# Patient Record
Sex: FEMALE | Race: WHITE | NOT HISPANIC OR LATINO | Employment: FULL TIME | URBAN - NONMETROPOLITAN AREA
[De-identification: names, ages, dates, MRNs, and addresses within clinical notes are randomized per-mention and may not be internally consistent; named-entity substitution may affect disease eponyms.]

---

## 2019-07-16 ENCOUNTER — OFFICE VISIT (OUTPATIENT)
Dept: URGENT CARE | Facility: PHYSICIAN GROUP | Age: 64
End: 2019-07-16
Payer: COMMERCIAL

## 2019-07-16 VITALS
RESPIRATION RATE: 14 BRPM | HEART RATE: 98 BPM | WEIGHT: 239 LBS | BODY MASS INDEX: 39.77 KG/M2 | SYSTOLIC BLOOD PRESSURE: 150 MMHG | DIASTOLIC BLOOD PRESSURE: 84 MMHG | OXYGEN SATURATION: 95 % | TEMPERATURE: 98 F

## 2019-07-16 DIAGNOSIS — F41.9 ANXIETY: ICD-10-CM

## 2019-07-16 DIAGNOSIS — I49.1 ATRIAL PREMATURE COMPLEXES: ICD-10-CM

## 2019-07-16 DIAGNOSIS — R06.02 SHORTNESS OF BREATH: ICD-10-CM

## 2019-07-16 PROCEDURE — 99204 OFFICE O/P NEW MOD 45 MIN: CPT | Performed by: FAMILY MEDICINE

## 2019-07-16 RX ORDER — ALPRAZOLAM 0.25 MG/1
TABLET ORAL
Qty: 30 TAB | Refills: 0 | Status: SHIPPED | OUTPATIENT
Start: 2019-07-16 | End: 2020-08-27 | Stop reason: SDUPTHER

## 2019-07-16 NOTE — PROGRESS NOTES
Chief Complaint:    Chief Complaint   Patient presents with   • Arm Pain     L arm    • Shaking     hard to breath yesterday   • Anxiety     happened yesterday        History of Present Illness:    Family present. She lives with them. This is a new problem. For 1 week, she has been having increasing emotional stress. A lot has been going on in the family. She has been feeling like she wants to hurt others. Yesterday, the stress built up to the point she was having anxiety attacks. She had shaking and shortness of breath. She has felt pain in left arm. She has had headache. She reports having similar type of symptoms in the distant past, possibly 30 years ago and was rx'd a medication to use prn. She does not have current chest pain. She and family deny that she would actually hurt herself or other people. She has never had any heart problems.      Review of Systems:    Constitutional: Negative for fever, chills, and diaphoresis.   Eyes: Negative for change in vision, photophobia, pain, redness, and discharge.  ENT: Negative for ear pain, ear discharge, hearing loss, tinnitus, nasal congestion, nosebleeds, and sore throat.    Respiratory: See HPI.   Cardiovascular: See HPI.    Gastrointestinal: Negative for abdominal pain, nausea, vomiting, diarrhea, constipation, blood in stool, and melena.   Genitourinary: Negative for dysuria, urinary urgency, urinary frequency, hematuria, and flank pain.   Musculoskeletal: See HPI.    Skin: Negative for rash and itching.   Neurological: See HPI.   Endo: Negative for polydipsia.   Heme: Does not bruise/bleed easily.   Psychiatric/Behavioral: See HPI.       Past Medical History:    Past Medical History:   Diagnosis Date   • Allergy    • Headache(784.0)    • Hypertension    • Migraine      Past Surgical History:    Past Surgical History:   Procedure Laterality Date   • ABDOMINAL HYSTERECTOMY TOTAL     • LIPOMA EXCISION     • OPEN REDUCTION      wrist   • TONSILLECTOMY     • WRIST  FUSION      right     Social History:    Social History     Social History   • Marital status: Single     Spouse name: N/A   • Number of children: N/A   • Years of education: N/A     Occupational History   • Not on file.     Social History Main Topics   • Smoking status: Never Smoker   • Smokeless tobacco: Never Used   • Alcohol use No   • Drug use: No   • Sexual activity: Not Currently     Other Topics Concern   • Not on file     Social History Narrative   • No narrative on file     Family History:    Family History   Problem Relation Age of Onset   • Diabetes Mother    • Hypertension Mother    • Stroke Mother    • Diabetes Sister    • Hypertension Sister    • Diabetes Brother    • Hypertension Brother    • Cancer Brother         lung, testicular   • Dementia Father 70        Alzheimer's   • Lung Disease Paternal Uncle         emphysema     Medications:    No current outpatient prescriptions on file prior to visit.     No current facility-administered medications on file prior to visit.      Allergies:    Allergies   Allergen Reactions   • Iodine    • Pcn [Penicillins]        Vitals:    Vitals:    07/16/19 0946   BP: 150/84   Pulse: 98   Resp: 14   Temp: 36.7 °C (98 °F)   TempSrc: Temporal   SpO2: 95%   Weight: 108.4 kg (239 lb)       Physical Exam:    Constitutional: Vital signs reviewed. Appears well-developed and well-nourished. At times on verge of tears.   Eyes: Sclera white, conjunctivae clear. PERRLA.  ENT: External ears normal. Hearing normal. Nasal mucosa pink. Lips/teeth are normal. Oral mucosa pink and moist. Posterior pharynx: WNL.  Neck: Neck supple.   Cardiovascular: Regular rate and rhythm. No murmur.  Pulmonary/Chest: Respirations non-labored. Clear to auscultation bilaterally.  Musculoskeletal: Normal gait. Normal range of motion. No tenderness to palpation. No muscular atrophy or weakness.  Neurological: Alert and oriented to person, place, and time. CN 2-12 intact. Muscle tone normal.  Coordination normal.   Skin: No rashes or lesions. Warm, dry, normal turgor.  Psychiatric: Normal mood and affect. Behavior is normal. Judgment and thought content normal.       Diagnostics:    EKG: Sinus rhythm 74. Borderline left axis deviation. Atrial premature complexes.    EKG: Sinus rhythm 76. Borderline left axis deviation.    (Machine reads)    Looking at EKGs, I do not see any signs of ischemia.    EKGs reviewed with them and copies to them.      Assessment / Plan:    1. Shortness of breath  - EKG    2. Anxiety  - ALPRAZolam (XANAX) 0.25 MG Tab; 1 OR 2 TABS UP TO 3 TIMES A DAY ONLY IF NEEDED FOR STRESS AND ANXIETY FOR UP TO 10 DAYS. MAY CAUSE DROWSINESS.  Dispense: 30 Tab; Refill: 0    3. Atrial premature complexes      Discussed with them DDX, management options, and risks, benefits, and alternatives to treatment plan agreed upon.    Premature Beats info given and discussed.    Family seems concerned about possible MI and I advised if this is their concern, patient should be seen in Emergency Room, not here as we do not have STAT labs and no imaging here.    I advised them if patient may hurt herself or others, she should go to [facility] in [place].    I advised I can Rx short-term med for anxiety. They would like.    Agreeable to medication prescribed.  report checked - no Rx x 3 years.    I advised her to follow-up with a PCP for further management of anxiety and mildly elevated blood pressure today.    I advised that patient should be seen in Emergency Room if getting worse or not improved with medication.

## 2020-01-10 ENCOUNTER — OFFICE VISIT (OUTPATIENT)
Dept: URGENT CARE | Facility: PHYSICIAN GROUP | Age: 65
End: 2020-01-10
Payer: COMMERCIAL

## 2020-01-10 ENCOUNTER — OCCUPATIONAL MEDICINE (OUTPATIENT)
Dept: URGENT CARE | Facility: PHYSICIAN GROUP | Age: 65
End: 2020-01-10
Payer: COMMERCIAL

## 2020-01-10 ENCOUNTER — APPOINTMENT (OUTPATIENT)
Dept: RADIOLOGY | Facility: IMAGING CENTER | Age: 65
End: 2020-01-10
Attending: NURSE PRACTITIONER
Payer: COMMERCIAL

## 2020-01-10 VITALS
WEIGHT: 275 LBS | DIASTOLIC BLOOD PRESSURE: 90 MMHG | BODY MASS INDEX: 45.76 KG/M2 | TEMPERATURE: 98.9 F | RESPIRATION RATE: 18 BRPM | HEART RATE: 73 BPM | SYSTOLIC BLOOD PRESSURE: 140 MMHG | OXYGEN SATURATION: 96 %

## 2020-01-10 VITALS — WEIGHT: 256 LBS | BODY MASS INDEX: 42.6 KG/M2

## 2020-01-10 DIAGNOSIS — S92.412A CLOSED DISPLACED FRACTURE OF PROXIMAL PHALANX OF LEFT GREAT TOE, INITIAL ENCOUNTER: ICD-10-CM

## 2020-01-10 DIAGNOSIS — S99.922A INJURY OF TOE ON LEFT FOOT, INITIAL ENCOUNTER: ICD-10-CM

## 2020-01-10 PROCEDURE — 99214 OFFICE O/P EST MOD 30 MIN: CPT | Mod: 29 | Performed by: NURSE PRACTITIONER

## 2020-01-10 PROCEDURE — 73660 X-RAY EXAM OF TOE(S): CPT | Mod: TC,FY,LT,29 | Performed by: NURSE PRACTITIONER

## 2020-01-10 ASSESSMENT — ENCOUNTER SYMPTOMS
CHILLS: 0
SHORTNESS OF BREATH: 0
FEVER: 0

## 2020-01-10 NOTE — PROGRESS NOTES
"Subjective:   Haylee Jean-Baptiste is a 64 y.o. female who presents for Toe Injury    DOI: 12/5/2019 patient states was unloading cans inside plastic wrap from a shelf when the plastic wrap came loose and the cans dropped on her left great toe. States since injury with bruising and pain. Has been taking Ibuprofen, icing, and cheikh taping the toe with mild improvement. States with intermittent numbness to the area and with swelling, where she \"feels a lump.\" Denies prior injury to the area     HPI     Review of Systems   Constitutional: Negative for chills and fever.   Respiratory: Negative for shortness of breath.    Cardiovascular: Negative for chest pain.   Musculoskeletal: Positive for joint pain.     Patient's PMH, SocHx, SurgHx, FamHx, Drug allergies and medications reviewed.     Objective:   /90   Pulse 73   Temp 37.2 °C (98.9 °F)   Resp 18   Wt 124.7 kg (275 lb)   SpO2 96%   BMI 45.76 kg/m²   Physical Exam  Vitals signs reviewed.   Constitutional:       General: She is not in acute distress.     Appearance: She is well-developed. She is not diaphoretic.   HENT:      Head: Normocephalic.      Right Ear: Hearing normal.      Left Ear: Hearing normal.      Nose: Nose normal.   Eyes:      General: Lids are normal.      Conjunctiva/sclera: Conjunctivae normal.      Pupils: Pupils are equal, round, and reactive to light.   Neck:      Musculoskeletal: Normal range of motion.   Cardiovascular:      Rate and Rhythm: Normal rate and regular rhythm.      Heart sounds: Normal heart sounds.   Pulmonary:      Effort: Pulmonary effort is normal. No respiratory distress.      Breath sounds: Normal breath sounds. No decreased breath sounds or wheezing.   Musculoskeletal:      Left foot: Decreased range of motion. Tenderness and swelling present.        Feet:    Skin:     General: Skin is warm.      Findings: No rash.   Neurological:      Mental Status: She is alert.   Psychiatric:         Speech: Speech normal.      " Behavior: Behavior normal.         Thought Content: Thought content normal.         Judgment: Judgment normal.       Left foot: Great toe with TTP to the DIP with swelling. No bruising present. Mild erythema. Neurovascular and sensation intact. Mild decreased ROM with flexion of the toe.     Assessment/Plan:   Assessment    1. Injury of toe on left foot, initial encounter  - DX-TOE(S) 2+ LEFT; Future  - REFERRAL TO ORTHOPEDICS    2. Closed displaced fracture of proximal phalanx of left great toe, initial encounter  - DX-TOE(S) 2+ LEFT; Future  - REFERRAL TO ORTHOPEDICS    Restrictions per D39    Differential diagnosis, natural history, supportive care, and indications for immediate follow-up discussed.     **Please note that all invasive procedures during this visit were performed by myself and/or the Medical Assistant under the supervision of the PA or MD in office**

## 2020-01-10 NOTE — LETTER
EMPLOYEE’S CLAIM FOR COMPENSATION/ REPORT OF INITIAL TREATMENT  FORM C-4    EMPLOYEE’S CLAIM - PROVIDE ALL INFORMATION REQUESTED   First Name  Haylee Last Name   Birthdate                    1955                Sex  female Claim Number   Home Address  [address] Age  64 y.o. Height   Weight  124.7 kg (275 lb) [facility]     [place] Zip  [address] Telephone  [phone] (home)    Mailing Address  [address] [place] Zip  [address] Primary Language Spoken  English    Insurer   Third Party   [employer]   Employee's Occupation (Job Title) When Injury or Occupational Disease Occurred      Employer's Name  [employer]  Telephone  [phone]    Employer Address  [address]  [address]    Date of Injury  12/5/2019               Hour of Injury  6:05 AM Date Employer Notified  12/5/2019 Last Day of Work after Injury or Occupational Disease  1/10/2020 Supervisor to Whom Injury Reported  Socorro Noel    Address or Location of Accident (if applicable)  [[address] [place] [address]]   What were you doing at the time of accident? (if applicable)  Putting Supplies on Shelf    How did this injury or occupational disease occur? (Be specific an answer in detail. Use additional sheet if necessary)  Dropped Case of Sauce on toes    If you believe that you have an occupational disease, when did you first have knowledge of the disability and it relationship to your employment?  n/a Witnesses to the Accident  n/a      Nature of Injury or Occupational Disease  Crushing  Part(s) of Body Injured or Affected  Foot (L), N/A, N/A    I certify that the above is true and correct to the best of my knowledge and that I have provided this information in order to obtain the benefits of [place]’s [place]s [place]).  I hereby authorize any physician, chiropractor, surgeon, practitioner, or other person, any hospital, including [facility] or [facility], any medical service organization, any insurance company, or other institution or organization to release to each other, any medical or other information, including benefits paid or payable, pertinent to this injury or disease, except information relative to diagnosis, treatment and/or counseling for AIDS, psychological conditions, alcohol or controlled substances, for which I must give specific authorization.  A Photostat of this authorization shall be as valid as the original.     Date   Place   Employee’s Signature   THIS REPORT MUST BE COMPLETED AND MAILED WITHIN 3 WORKING DAYS OF TREATMENT   Place  [facility]  Name of Facility  [facility]   Date  1/10/2020 Diagnosis  (S99.922A) Injury of toe on left foot, initial encounter  (S92.412A) Closed displaced fracture of proximal phalanx of left great toe, initial encounter Is there evidence the injured employee was under the influence of alcohol and/or another controlled substance at the time of accident?   Hour  1:15 PM Description of Injury or Disease  Diagnoses of Injury of toe on left foot, initial encounter and Closed displaced fracture of proximal phalanx of left great toe, initial encounter were pertinent to this visit.     Treatment  Must wear post op shoe. Rest and elevate the affected area with pillows.  You may apply ice packs to the affected area up to 4 times daily for 30 minutes at a time  OTC Ibuprofen.  Referral to Ortho  Have you advised the patient to remain off work five days or more? No   X-Ray Findings  Positive   If Yes   From Date  To Date      From information given by the employee, together with medical evidence, can you directly connect this injury or occupational disease as job incurred?  Yes If No Full Duty No  "Modified Duty  Yes   Is additional medical care by a physician indicated?  Yes If Modified Duty, Specify any Limitations / Restrictions      Do you know of any previous injury or disease contributing to this condition or occupational disease?                            No   Date  1/10/2020 Print Doctor’s Name NICO Barclay I certify the employer’s copy of  this form was mailed on:   Address  [address] Insurer’s Use Only     [place] [facility]  [address]    Provider’s Tax ID Number  [id number] Telephone  Dept: [phone]        armando-MARTHA Wild   e-Signature: Dr. Arben Donnelly,   Medical Director Degree  MD        ORIGINAL-TREATING PHYSICIAN OR CHIROPRACTOR    PAGE 2-INSURER/TPA    PAGE 3-EMPLOYER    PAGE 4-EMPLOYEE             Form C-4 (rev.10/07)              BRIEF DESCRIPTION OF RIGHTS AND BENEFITS  (Pursuant to [place]RS 616C.050)    Notice of Injury or Occupational Disease (Incident Report Form C-1): If an injury or occupational disease (OD) arises out of and in the course of employment, you must provide written notice to your employer as soon as practicable, but no later than 7 days after the accident or OD. Your employer shall maintain a sufficient supply of the required forms.    Claim for Compensation (Form C-4): If medical treatment is sought, the form C-4 is available at the place of initial treatment. A completed \"Claim for Compensation\" (Form C-4) must be filed within 90 days after an accident or OD. The treating physician or chiropractor must, within 3 working days after treatment, complete and mail to the employer, the employer's insurer and third-party , the Claim for Compensation.    Medical Treatment: If you require medical treatment for your on-the-job injury or OD, you may be required to select a physician or chiropractor from a list provided by your workers’ compensation insurer, if it has contracted with an Organization for Managed Care " (MCO) or Preferred Provider Organization (PPO) or providers of health care. If your employer has not entered into a contract with an MCO or PPO, you may select a physician or chiropractor from the Panel of Physicians and Chiropractors. Any medical costs related to your industrial injury or OD will be paid by your insurer.    Temporary Total Disability (TTD): If your doctor has certified that you are unable to work for a period of at least 5 consecutive days, or 5 cumulative days in a 20-day period, or places restrictions on you that your employer does not accommodate, you may be entitled to TTD compensation.    Temporary Partial Disability (TPD): If the wage you receive upon reemployment is less than the compensation for TTD to which you are entitled, the insurer may be required to pay you TPD compensation to make up the difference. TPD can only be paid for a maximum of 24 months.    Permanent Partial Disability (PPD): When your medical condition is stable and there is an indication of a PPD as a result of your injury or OD, within 30 days, your insurer must arrange for an evaluation by a rating physician or chiropractor to determine the degree of your PPD. The amount of your PPD award depends on the date of injury, the results of the PPD evaluation and your age and wage.    Permanent Total Disability (PTD): If you are medically certified by a treating physician or chiropractor as permanently and totally disabled and have been granted a PTD status by your insurer, you are entitled to receive monthly benefits not to exceed 66 2/3% of your average monthly wage. The amount of your PTD payments is subject to reduction if you previously received a PPD award.    Vocational Rehabilitation Services: You may be eligible for vocational rehabilitation services if you are unable to return to the job due to a permanent physical impairment or permanent restrictions as a result of your injury or occupational  disease.    Transportation and Per Mario Reimbursement: You may be eligible for travel expenses and per mario associated with medical treatment.    Reopening: You may be able to reopen your claim if your condition worsens after claim closure.    Appeal Process: If you disagree with a written determination issued by the insurer or the insurer does not respond to your request, you may appeal to the Department of Administration, , by following the instructions contained in your determination letter. You must appeal the determination within 70 days from the date of the determination letter at [address], [address], [place], [place] [address], or [address], [address], [place], [place] [address]. If you disagree with the  decision, you may appeal to the Department of Administration, . You must file your appeal within 30 days from the date of the  decision letter at [address], [address], [place], [place] [address], or [address], [place], [place] [address]. If you disagree with a decision of an , you may file a petition for judicial review with the District Court. You must do so within 30 days of the Appeal Officer’s decision. You may be represented by an  at your own expense or you may contact the [facility] for possible representation.    [place]  for Injured Workers (NAIW): If you disagree with a  decision, you may request that NAIW represent you without charge at an  Hearing. For information regarding denial of benefits, you may contact the [facility] at: [address], [address], [place] [address], [phone], or [address], [place] [address], [phone]    To File a Complaint with the Division: If you wish to file a complaint with the  of the Division of Industrial Relations (DIR),  please contact the  Workers’ Compensation Section, [address], [address], [place], [place] [address], telephone ([phone]) [phone], or [address], [address], [place], [place] [address], telephone [phone].    For assistance with Workers’ Compensation Issues: You may contact the Office of the Governor Consumer Health Assistance, [address] [place] [address], [address], [place], [place] [address], Toll Free 1-428.984.8344, Web site: http://[email or web address].[facility].[email or web address]., E-mail [email or web address].[email or web address].                   __________________________________________________________________                                                     _________        Employee Name / Signature                                                                                                                                              Date                                                                                                                                                                                                     D-2 (rev. 06/18)

## 2020-01-10 NOTE — LETTER
"   [facility] [place] [address]  Phone:  [phone] - Fax:  [phone]   [facility] Progress Report and Disability Certification  Date of Service: 1/10/2020   No Show:  No  Date / Time of Next Visit: 1/24/2020   Claim Information   Patient Name: Haylee Jean-Baptiste  Claim Number:     Employer: [employer]  Date of Injury: 12/5/2019     Insurer / TPA: [employer]  ID / SSN:     Occupation:   Diagnosis: Diagnoses of Injury of toe on left foot, initial encounter and Closed displaced fracture of proximal phalanx of left great toe, initial encounter were pertinent to this visit.    Medical Information   Related to Industrial Injury? Yes    Subjective Complaints:  DOI: 12/5/2019 patient states was unloading cans inside plastic wrap from a shelf when the plastic wrap came loose and the cans dropped on her left great toe. States since injury with bruising and pain. Has been taking Ibuprofen, icing, and cheikh taping the toe with mild improvement. States with intermittent numbness to the area and with swelling, where she \"feels a lump.\" Denies prior injury to the area     Objective Findings: Left foot: Great toe with TTP to the DIP with swelling. No bruising present. Mild erythema. Neurovascular and sensation intact. Mild decreased ROM with flexion of the toe.     Pre-Existing Condition(s): None   Assessment:   Initial Visit    Status: Additional Care Required  Permanent Disability:No    Plan: Diagnostics    Diagnostics: X-ray    Comments:  Xray:\"Minimally displaced, possibly subacute fracture through the midshaft of the first proximal phalanx.\"    Disability Information   Status: Released to Restricted Duty    From:  1/10/2020  Through: 1/24/2020 Restrictions are: Temporary   Physical Restrictions   Sitting:    Standing:  < or = to 4 hrs/day Stooping:    Bending:      Squatting:    Walking:  < or = to 4 hrs/day " Climbing:    Pushing:      Pulling:    Other:    Reaching Above Shoulder (L):   Reaching Above Shoulder (R):       Reaching Below Shoulder (L):    Reaching Below Shoulder (R):      Not to exceed Weight Limits   Carrying(hrs):   Weight Limit(lb):   Lifting(hrs):   Weight  Limit(lb):     Comments: Must wear post op shoe. Rest and elevate the affected area with pillows.  You may apply ice packs to the affected area up to 4 times daily for 30 minutes at a time  OTC Ibuprofen.  Referral to Ortho        Repetitive Actions   Hands: i.e. Fine Manipulations from Grasping:     Feet: i.e. Operating Foot Controls:     Driving / Operate Machinery:     Physician Name: NICO Barclay Physician Signature: MARTHA Salamanca e-Signature: Dr. Arben Donnelly, Medical Director   Clinic Name / Location: [address] [address] Clinic Phone Number: Dept: [phone]   Appointment Time: 1:05 Pm Visit Start Time: 1:15 PM   Check-In Time:  1:14 Pm Visit Discharge Time:  1:52 PM    Original-Treating Physician or Chiropractor    Page 2-Insurer/TPA    Page 3-Employer    Page 4-Employee

## 2020-08-27 ENCOUNTER — OFFICE VISIT (OUTPATIENT)
Dept: MEDICAL GROUP | Facility: PHYSICIAN GROUP | Age: 65
End: 2020-08-27
Payer: COMMERCIAL

## 2020-08-27 VITALS
OXYGEN SATURATION: 97 % | SYSTOLIC BLOOD PRESSURE: 122 MMHG | RESPIRATION RATE: 14 BRPM | TEMPERATURE: 98.1 F | WEIGHT: 273 LBS | BODY MASS INDEX: 45.48 KG/M2 | HEART RATE: 71 BPM | HEIGHT: 65 IN | DIASTOLIC BLOOD PRESSURE: 76 MMHG

## 2020-08-27 DIAGNOSIS — Z13.21 ENCOUNTER FOR VITAMIN DEFICIENCY SCREENING: ICD-10-CM

## 2020-08-27 DIAGNOSIS — Z13.29 SCREENING FOR THYROID DISORDER: ICD-10-CM

## 2020-08-27 DIAGNOSIS — Z12.39 SCREENING FOR BREAST CANCER: ICD-10-CM

## 2020-08-27 DIAGNOSIS — F41.9 ANXIETY: ICD-10-CM

## 2020-08-27 DIAGNOSIS — Z13.1 SCREENING FOR DIABETES MELLITUS: ICD-10-CM

## 2020-08-27 DIAGNOSIS — I10 ESSENTIAL HYPERTENSION: ICD-10-CM

## 2020-08-27 DIAGNOSIS — R41.3 MEMORY LOSS: ICD-10-CM

## 2020-08-27 DIAGNOSIS — F51.01 PRIMARY INSOMNIA: ICD-10-CM

## 2020-08-27 PROCEDURE — 99214 OFFICE O/P EST MOD 30 MIN: CPT | Performed by: NURSE PRACTITIONER

## 2020-08-27 RX ORDER — ALPRAZOLAM 0.25 MG/1
0.25 TABLET ORAL 3 TIMES DAILY PRN
COMMUNITY
End: 2020-08-27

## 2020-08-27 RX ORDER — CHOLECALCIFEROL (VITAMIN D3) 25 MCG
TABLET,CHEWABLE ORAL
COMMUNITY
End: 2021-04-23

## 2020-08-27 RX ORDER — ALPRAZOLAM 0.25 MG/1
TABLET ORAL
Qty: 30 TAB | Refills: 0 | Status: SHIPPED | OUTPATIENT
Start: 2020-08-27 | End: 2020-09-06

## 2020-08-27 ASSESSMENT — PATIENT HEALTH QUESTIONNAIRE - PHQ9
5. POOR APPETITE OR OVEREATING: 1 - SEVERAL DAYS
SUM OF ALL RESPONSES TO PHQ QUESTIONS 1-9: 9
CLINICAL INTERPRETATION OF PHQ2 SCORE: 4

## 2020-08-27 NOTE — PROGRESS NOTES
Chief Complaint   Patient presents with   • Establish Care   • Memory Loss     Per patient          This is a 65 y.o.female patient that presents today with the following: Establish care with new PCP, medication refills    Hypertension  Chronic and stable  Well-controlled on lifestyle modifications  Blood pressure today well within normal limits and denies symptoms of hypertension  Due for labs    Memory loss  Patient reports to me that her daughter feels that she has had memory changes  Patient does not agree with this at all, however she does have a family history of Alzheimer's disease especially maternal aunt  Patient is very well oriented x4, does not report any confusion or any other neurological symptoms  Nobody else especially her coworkers mention any concerns for memory changes, patient reports to me that she feels her daughter she is more worried because of a family history    Primary insomnia  This is chronic and stable  Well-controlled with very sporadic use of alprazolam, this was last refilled over a year ago  Yamilet LENNON reviewed, taking medications appropriately with no concerning behavior  Patient understands the risks associated with use of benzodiazepines  Discussed the importance of sleep hygiene    Anxiety  See additional notes, takes alprazolam very sporadically not only for insomnia but for very sporadic episodes of panic attack      No visits with results within 1 Month(s) from this visit.   Latest known visit with results is:   Hospital Outpatient Visit on 04/19/2016   Component Date Value   • Occult Blood, IA 04/19/2016 Negative          clinical course has been stable    Past Medical History:   Diagnosis Date   • Allergy    • Anxiety 8/28/2020   • Headache(784.0)    • Hypertension    • Migraine        Past Surgical History:   Procedure Laterality Date   • ABDOMINAL HYSTERECTOMY TOTAL     • LIPOMA EXCISION     • OPEN REDUCTION      wrist   • TONSILLECTOMY     • WRIST FUSION      right  "      Family History   Problem Relation Age of Onset   • Diabetes Mother    • Hypertension Mother    • Stroke Mother    • Diabetes Sister    • Hypertension Sister    • Diabetes Brother    • Hypertension Brother    • Cancer Brother         lung, testicular   • Dementia Father 70        Alzheimer's   • Lung Disease Paternal Uncle         emphysema       Iodine and Pcn [penicillins]    Current Outpatient Medications Ordered in Epic   Medication Sig Dispense Refill   • ALPRAZolam (XANAX) 0.25 MG Tab 1 OR 2 TABS UP TO 3 TIMES A DAY ONLY IF NEEDED FOR STRESS AND ANXIETY FOR UP TO 10 DAYS. MAY CAUSE DROWSINESS. 30 Tab 0   • TURMERIC PO Take  by mouth.     • VITAMIN D, ERGOCALCIFEROL, PO Take  by mouth.     • Cyanocobalamin (B-12) 1000 MCG Cap Take  by mouth.     • APPLE CIDER VINEGAR PO Take  by mouth.       No current Epic-ordered facility-administered medications on file.        Constitutional ROS: No unexpected change in weight, No weakness, No unexplained fevers, sweats, or chills, Positive for insomnia   Pulmonary ROS: No chronic cough, sputum, or hemoptysis, No shortness of breath, No recent change in breathing  Cardiovascular ROS: No chest pain, No edema, No palpitations  Gastrointestinal ROS: No abdominal pain, No nausea, vomiting, diarrhea, or constipation  Musculoskeletal/Extremities ROS: No clubbing, No peripheral edema, No pain, redness or swelling on the joints  Neurologic ROS: Normal development, No seizures, No weakness  Psych ROS: Positive per HPI    Physical exam:  /76   Pulse 71   Temp 36.7 °C (98.1 °F) (Temporal)   Resp 14   Ht 1.651 m (5' 5\")   Wt 123.8 kg (273 lb)   SpO2 97%   BMI 45.43 kg/m²   General Appearance: Very pleasant middle-aged older female, alert, no distress, obese, well-groomed  Skin: Skin color, texture, turgor normal. No rashes or lesions.  Neck: negative findings: no asymmetry, masses, or scars, no adenopathy, trachea midline and normal to palpitation  Lungs: negative " findings: normal respiratory rate and rhythm, lungs clear to auscultation  Heart: negative. RRR without murmur, gallop, or rubs.  No ectopy.  Abdomen: Abdomen soft, non-tender. BS normal. No masses,  No organomegaly  Musculoskeletal: negative findings: no evidence of joint instability, no evidence of muscle atrophy, no deformities present  Peripheral Pulses: Normal  Neurologic: intact, CN II through XII grossly intact    Medical decision making/discussion:     Labs    Mammogram    Refilled Alprazolam, take only as needed    Haylee was seen today for establish care and memory loss.    Diagnoses and all orders for this visit:    Memory loss    Essential hypertension  -     CBC WITH DIFFERENTIAL; Future  -     Comp Metabolic Panel; Future  -     Lipid Profile; Future    Primary insomnia    Anxiety  -     ALPRAZolam (XANAX) 0.25 MG Tab; 1 OR 2 TABS UP TO 3 TIMES A DAY ONLY IF NEEDED FOR STRESS AND ANXIETY FOR UP TO 10 DAYS. MAY CAUSE DROWSINESS.    Screening for diabetes mellitus  -     HEMOGLOBIN A1C; Future    Screening for thyroid disorder  -     TSH WITH REFLEX TO FT4; Future    Encounter for vitamin deficiency screening  -     VITAMIN D,25 HYDROXY; Future  -     VITAMIN B12; Future    Screening for breast cancer  -     MA-SCREENING MAMMO BILAT W/CAD; Future        Return in about 1 year (around 8/27/2021).        Please note that this dictation was created using voice recognition software. I have made every reasonable attempt to correct obvious errors, but I expect that there are errors of grammar and possibly content that I did not discover before finalizing the note.

## 2020-08-28 PROBLEM — F41.9 ANXIETY: Status: ACTIVE | Noted: 2020-08-28

## 2020-08-28 NOTE — ASSESSMENT & PLAN NOTE
See additional notes, takes alprazolam very sporadically not only for insomnia but for very sporadic episodes of panic attack

## 2020-08-28 NOTE — ASSESSMENT & PLAN NOTE
Chronic and stable  Well-controlled on lifestyle modifications  Blood pressure today well within normal limits and denies symptoms of hypertension  Due for labs

## 2020-08-28 NOTE — ASSESSMENT & PLAN NOTE
This is chronic and stable  Well-controlled with very sporadic use of alprazolam, this was last refilled over a year ago  Yamilet LENNON reviewed, taking medications appropriately with no concerning behavior  Patient understands the risks associated with use of benzodiazepines  Discussed the importance of sleep hygiene

## 2020-08-28 NOTE — ASSESSMENT & PLAN NOTE
Patient reports to me that her daughter feels that she has had memory changes  Patient does not agree with this at all, however she does have a family history of Alzheimer's disease especially maternal aunt  Patient is very well oriented x4, does not report any confusion or any other neurological symptoms  Nobody else especially her coworkers mention any concerns for memory changes, patient reports to me that she feels her daughter she is more worried because of a family history

## 2020-09-04 ENCOUNTER — HOSPITAL ENCOUNTER (OUTPATIENT)
Dept: LAB | Facility: MEDICAL CENTER | Age: 65
End: 2020-09-04
Attending: NURSE PRACTITIONER
Payer: COMMERCIAL

## 2020-09-04 DIAGNOSIS — I10 ESSENTIAL HYPERTENSION: ICD-10-CM

## 2020-09-04 DIAGNOSIS — Z13.29 SCREENING FOR THYROID DISORDER: ICD-10-CM

## 2020-09-04 DIAGNOSIS — Z13.21 ENCOUNTER FOR VITAMIN DEFICIENCY SCREENING: ICD-10-CM

## 2020-09-04 DIAGNOSIS — Z13.1 SCREENING FOR DIABETES MELLITUS: ICD-10-CM

## 2020-09-04 LAB
25(OH)D3 SERPL-MCNC: 32 NG/ML (ref 30–100)
ALBUMIN SERPL BCP-MCNC: 4.1 G/DL (ref 3.2–4.9)
ALBUMIN/GLOB SERPL: 1.5 G/DL
ALP SERPL-CCNC: 81 U/L (ref 30–99)
ALT SERPL-CCNC: 12 U/L (ref 2–50)
ANION GAP SERPL CALC-SCNC: 12 MMOL/L (ref 7–16)
AST SERPL-CCNC: 16 U/L (ref 12–45)
BASOPHILS # BLD AUTO: 0.5 % (ref 0–1.8)
BASOPHILS # BLD: 0.03 K/UL (ref 0–0.12)
BILIRUB SERPL-MCNC: 0.5 MG/DL (ref 0.1–1.5)
BUN SERPL-MCNC: 13 MG/DL (ref 8–22)
CALCIUM SERPL-MCNC: 9.6 MG/DL (ref 8.5–10.5)
CHLORIDE SERPL-SCNC: 108 MMOL/L (ref 96–112)
CHOLEST SERPL-MCNC: 188 MG/DL (ref 100–199)
CO2 SERPL-SCNC: 23 MMOL/L (ref 20–33)
CREAT SERPL-MCNC: 0.59 MG/DL (ref 0.5–1.4)
EOSINOPHIL # BLD AUTO: 0.13 K/UL (ref 0–0.51)
EOSINOPHIL NFR BLD: 2.3 % (ref 0–6.9)
ERYTHROCYTE [DISTWIDTH] IN BLOOD BY AUTOMATED COUNT: 46.5 FL (ref 35.9–50)
EST. AVERAGE GLUCOSE BLD GHB EST-MCNC: 105 MG/DL
FASTING STATUS PATIENT QL REPORTED: NORMAL
GLOBULIN SER CALC-MCNC: 2.8 G/DL (ref 1.9–3.5)
GLUCOSE SERPL-MCNC: 90 MG/DL (ref 65–99)
HBA1C MFR BLD: 5.3 % (ref 0–5.6)
HCT VFR BLD AUTO: 41.7 % (ref 37–47)
HDLC SERPL-MCNC: 56 MG/DL
HGB BLD-MCNC: 13.7 G/DL (ref 12–16)
IMM GRANULOCYTES # BLD AUTO: 0.02 K/UL (ref 0–0.11)
IMM GRANULOCYTES NFR BLD AUTO: 0.4 % (ref 0–0.9)
LDLC SERPL CALC-MCNC: 115 MG/DL
LYMPHOCYTES # BLD AUTO: 1.84 K/UL (ref 1–4.8)
LYMPHOCYTES NFR BLD: 32.7 % (ref 22–41)
MCH RBC QN AUTO: 32 PG (ref 27–33)
MCHC RBC AUTO-ENTMCNC: 32.9 G/DL (ref 33.6–35)
MCV RBC AUTO: 97.4 FL (ref 81.4–97.8)
MONOCYTES # BLD AUTO: 0.42 K/UL (ref 0–0.85)
MONOCYTES NFR BLD AUTO: 7.5 % (ref 0–13.4)
NEUTROPHILS # BLD AUTO: 3.19 K/UL (ref 2–7.15)
NEUTROPHILS NFR BLD: 56.6 % (ref 44–72)
NRBC # BLD AUTO: 0 K/UL
NRBC BLD-RTO: 0 /100 WBC
PLATELET # BLD AUTO: 295 K/UL (ref 164–446)
PMV BLD AUTO: 10.1 FL (ref 9–12.9)
POTASSIUM SERPL-SCNC: 4 MMOL/L (ref 3.6–5.5)
PROT SERPL-MCNC: 6.9 G/DL (ref 6–8.2)
RBC # BLD AUTO: 4.28 M/UL (ref 4.2–5.4)
SODIUM SERPL-SCNC: 143 MMOL/L (ref 135–145)
TRIGL SERPL-MCNC: 85 MG/DL (ref 0–149)
TSH SERPL DL<=0.005 MIU/L-ACNC: 1.81 UIU/ML (ref 0.38–5.33)
VIT B12 SERPL-MCNC: 665 PG/ML (ref 211–911)
WBC # BLD AUTO: 5.6 K/UL (ref 4.8–10.8)

## 2020-09-04 PROCEDURE — 82607 VITAMIN B-12: CPT

## 2020-09-04 PROCEDURE — 80053 COMPREHEN METABOLIC PANEL: CPT

## 2020-09-04 PROCEDURE — 80061 LIPID PANEL: CPT

## 2020-09-04 PROCEDURE — 85025 COMPLETE CBC W/AUTO DIFF WBC: CPT

## 2020-09-04 PROCEDURE — 82306 VITAMIN D 25 HYDROXY: CPT

## 2020-09-04 PROCEDURE — 83036 HEMOGLOBIN GLYCOSYLATED A1C: CPT

## 2020-09-04 PROCEDURE — 36415 COLL VENOUS BLD VENIPUNCTURE: CPT

## 2020-09-04 PROCEDURE — 84443 ASSAY THYROID STIM HORMONE: CPT

## 2020-09-09 ENCOUNTER — TELEPHONE (OUTPATIENT)
Dept: MEDICAL GROUP | Facility: PHYSICIAN GROUP | Age: 65
End: 2020-09-09

## 2021-01-25 ENCOUNTER — APPOINTMENT (OUTPATIENT)
Dept: MEDICAL GROUP | Facility: PHYSICIAN GROUP | Age: 66
End: 2021-01-25
Payer: COMMERCIAL

## 2021-01-27 ENCOUNTER — TELEMEDICINE (OUTPATIENT)
Dept: MEDICAL GROUP | Facility: PHYSICIAN GROUP | Age: 66
End: 2021-01-27
Payer: COMMERCIAL

## 2021-01-27 VITALS — WEIGHT: 270 LBS | HEIGHT: 65 IN | BODY MASS INDEX: 44.98 KG/M2

## 2021-01-27 DIAGNOSIS — R21 RASH: ICD-10-CM

## 2021-01-27 PROCEDURE — 99213 OFFICE O/P EST LOW 20 MIN: CPT | Mod: 95,CR | Performed by: NURSE PRACTITIONER

## 2021-01-27 RX ORDER — TRIAMCINOLONE ACETONIDE 1 MG/G
CREAM TOPICAL
Qty: 45 G | Refills: 1 | Status: SHIPPED | OUTPATIENT
Start: 2021-01-27 | End: 2024-03-18

## 2021-01-27 ASSESSMENT — FIBROSIS 4 INDEX: FIB4 SCORE: 1.02

## 2021-01-27 ASSESSMENT — PATIENT HEALTH QUESTIONNAIRE - PHQ9
SUM OF ALL RESPONSES TO PHQ QUESTIONS 1-9: 6
5. POOR APPETITE OR OVEREATING: 1 - SEVERAL DAYS
CLINICAL INTERPRETATION OF PHQ2 SCORE: 3

## 2021-01-27 NOTE — PROGRESS NOTES
Virtual Visit: Established Patient   This visit was conducted via Zoom using secure and encrypted videoconferencing technology. The patient was in a private location in the state of [place].    The patient's identity was confirmed and verbal consent was obtained for this virtual visit.    Subjective:   CC:   Chief Complaint   Patient presents with   • Rash       Haylee Jean-Baptiste is a 65 y.o. female presenting for evaluation and management of:    Rash  Patient has developed what she describes as eczematous type rash on her right lower groin  Initially she thought this was shingles but states that she googled typical symptoms of shingles and her symptoms are not consistent  Does report rash has nearly resolved  She understands that it is difficult to adequately evaluated this on a VV, but does agree to empirically treat with steroid cream and will follow up in office if rash returns, worsens or if she develops other symptoms        ROS   Denies any recent fevers or chills. No nausea or vomiting. No chest pains or shortness of breath.   Skin ROS: positive per HPI    Allergies   Allergen Reactions   • Iodine    • Pcn [Penicillins]        Current medicines (including changes today)  Current Outpatient Medications   Medication Sig Dispense Refill   • triamcinolone acetonide (KENALOG) 0.1 % Cream Apply to affected area twice a day for no more than 2 weeks at a time 45 g 1   • TURMERIC PO Take  by mouth.     • VITAMIN D, ERGOCALCIFEROL, PO Take  by mouth.     • Cyanocobalamin (B-12) 1000 MCG Cap Take  by mouth.     • APPLE CIDER VINEGAR PO Take  by mouth.       No current facility-administered medications for this visit.        Patient Active Problem List    Diagnosis Date Noted   • Rash 01/27/2021   • Anxiety 08/28/2020   • Memory loss 08/27/2020   • Primary insomnia 08/27/2020   • Fatigue 09/24/2014   • Weight loss 09/24/2014   • Hypertension 09/16/2014       Family History   Problem Relation Age of Onset   • Diabetes Mother   "  • Hypertension Mother    • Stroke Mother    • Diabetes Sister    • Hypertension Sister    • Diabetes Brother    • Hypertension Brother    • Cancer Brother         lung, testicular   • Dementia Father 70        Alzheimer's   • Lung Disease Paternal Uncle         emphysema       She  has a past medical history of Allergy, Anxiety (8/28/2020), Headache(784.0), Hypertension, and Migraine. She also has no past medical history of Addisons disease (HCC), Adrenal disorder (HCC), Anemia, Arrhythmia, Arthritis, ASTHMA, Asthma, Blood transfusion, Blood transfusion, without reported diagnosis, Cancer (HCC), CATARACT, CHF (congestive heart failure) (HCC), Clotting disorder (HCC), COPD, Cushings syndrome (HCC), Depression, Diabetes, Diabetic neuropathy (HCC), EMPHYSEMA, GERD (gastroesophageal reflux disease), Glaucoma, Goiter, Heart attack (HCC), Heart murmur, HIV (human immunodeficiency virus infection), Hyperlipidemia, IBD (inflammatory bowel disease), Kidney disease, Meningitis, Muscle disorder, OSTEOPOROSIS, Parathyroid disorder (HCC), Pituitary disease (HCC), Seizure (HCC), Sickle cell disease (HCC), Stroke (HCC), Substance abuse (HCC), Thyroid disease, Tuberculosis, Ulcer, Unspecified cataract, or Urinary tract infection, site not specified.  She  has a past surgical history that includes abdominal hysterectomy total; tonsillectomy; wrist fusion; open reduction; and lipoma excision.       Objective:   Ht 1.651 m (5' 5\")   Wt 122.5 kg (270 lb)   BMI 44.93 kg/m²     Physical Exam:  Constitutional: Alert, no distress, well-groomed.  Skin: No rashes in visible areas.  Eye: Round. Conjunctiva clear, lids normal. No icterus.   ENMT: Lips pink without lesions, good dentition, moist mucous membranes. Phonation normal.  Neck: No masses, no thyromegaly. Moves freely without pain.  Respiratory: Unlabored respiratory effort, no cough or audible wheeze  Psych: Alert and oriented x3, normal affect and mood.       Assessment and Plan: "   The following treatment plan was discussed:     As mentioned above    1. Rash  - triamcinolone acetonide (KENALOG) 0.1 % Cream; Apply to affected area twice a day for no more than 2 weeks at a time  Dispense: 45 g; Refill: 1        Follow-up: Return if symptoms worsen or fail to improve, for Follow-up.

## 2021-01-27 NOTE — ASSESSMENT & PLAN NOTE
Patient has developed what she describes as eczematous type rash on her right lower groin  Initially she thought this was shingles but states that she googled typical symptoms of shingles and her symptoms are not consistent  Does report rash has nearly resolved  She understands that it is difficult to adequately evaluated this on a VV, but does agree to empirically treat with steroid cream and will follow up in office if rash returns, worsens or if she develops other symptoms

## 2021-02-18 ENCOUNTER — PRE-ADMISSION TESTING (OUTPATIENT)
Dept: ADMISSIONS | Facility: MEDICAL CENTER | Age: 66
End: 2021-02-18
Attending: OPHTHALMOLOGY
Payer: COMMERCIAL

## 2021-02-18 DIAGNOSIS — Z01.812 PRE-OPERATIVE LABORATORY EXAMINATION: ICD-10-CM

## 2021-02-18 LAB
SARS-COV-2 RNA RESP QL NAA+PROBE: NOTDETECTED
SPECIMEN SOURCE: NORMAL

## 2021-02-18 PROCEDURE — C9803 HOPD COVID-19 SPEC COLLECT: HCPCS

## 2021-02-18 PROCEDURE — U0003 INFECTIOUS AGENT DETECTION BY NUCLEIC ACID (DNA OR RNA); SEVERE ACUTE RESPIRATORY SYNDROME CORONAVIRUS 2 (SARS-COV-2) (CORONAVIRUS DISEASE [COVID-19]), AMPLIFIED PROBE TECHNIQUE, MAKING USE OF HIGH THROUGHPUT TECHNOLOGIES AS DESCRIBED BY CMS-2020-01-R: HCPCS

## 2021-02-18 PROCEDURE — U0005 INFEC AGEN DETEC AMPLI PROBE: HCPCS

## 2021-02-18 RX ORDER — OMEPRAZOLE 20 MG/1
20 CAPSULE, DELAYED RELEASE ORAL DAILY
COMMUNITY
End: 2022-01-06

## 2021-02-18 ASSESSMENT — FIBROSIS 4 INDEX: FIB4 SCORE: 1.02

## 2021-02-23 ENCOUNTER — HOSPITAL ENCOUNTER (OUTPATIENT)
Facility: MEDICAL CENTER | Age: 66
End: 2021-02-23
Attending: OPHTHALMOLOGY | Admitting: OPHTHALMOLOGY
Payer: COMMERCIAL

## 2021-02-23 VITALS
DIASTOLIC BLOOD PRESSURE: 116 MMHG | HEIGHT: 64 IN | BODY MASS INDEX: 43.74 KG/M2 | TEMPERATURE: 97.1 F | OXYGEN SATURATION: 97 % | HEART RATE: 65 BPM | WEIGHT: 256.17 LBS | SYSTOLIC BLOOD PRESSURE: 188 MMHG | RESPIRATION RATE: 16 BRPM

## 2021-02-23 PROCEDURE — 700101 HCHG RX REV CODE 250: Performed by: OPHTHALMOLOGY

## 2021-02-23 PROCEDURE — 700105 HCHG RX REV CODE 258: Performed by: OPHTHALMOLOGY

## 2021-02-23 PROCEDURE — 700101 HCHG RX REV CODE 250

## 2021-02-23 RX ORDER — BALANCED SALT SOLUTION ENRICHED WITH BICARBONATE, DEXTROSE, AND GLUTATHIONE
KIT INTRAOCULAR
Status: DISCONTINUED
Start: 2021-02-23 | End: 2021-02-23 | Stop reason: HOSPADM

## 2021-02-23 RX ORDER — SODIUM CHLORIDE, SODIUM LACTATE, POTASSIUM CHLORIDE, CALCIUM CHLORIDE 600; 310; 30; 20 MG/100ML; MG/100ML; MG/100ML; MG/100ML
INJECTION, SOLUTION INTRAVENOUS CONTINUOUS
Status: DISCONTINUED | OUTPATIENT
Start: 2021-02-23 | End: 2021-02-23 | Stop reason: HOSPADM

## 2021-02-23 RX ORDER — FLURBIPROFEN SODIUM 0.3 MG/ML
SOLUTION/ DROPS OPHTHALMIC
Status: COMPLETED
Start: 2021-02-23 | End: 2021-02-23

## 2021-02-23 RX ORDER — TROPICAMIDE 10 MG/ML
SOLUTION/ DROPS OPHTHALMIC
Status: DISCONTINUED
Start: 2021-02-23 | End: 2021-02-23 | Stop reason: HOSPADM

## 2021-02-23 RX ORDER — PHENYLEPHRINE HYDROCHLORIDE 25 MG/ML
SOLUTION/ DROPS OPHTHALMIC
Status: COMPLETED
Start: 2021-02-23 | End: 2021-02-23

## 2021-02-23 RX ORDER — CYCLOPENTOLATE HYDROCHLORIDE 10 MG/ML
SOLUTION/ DROPS OPHTHALMIC
Status: COMPLETED
Start: 2021-02-23 | End: 2021-02-23

## 2021-02-23 RX ORDER — MOXIFLOXACIN 5 MG/ML
SOLUTION/ DROPS OPHTHALMIC
Status: COMPLETED
Start: 2021-02-23 | End: 2021-02-23

## 2021-02-23 RX ADMIN — SODIUM CHLORIDE, POTASSIUM CHLORIDE, SODIUM LACTATE AND CALCIUM CHLORIDE: 600; 310; 30; 20 INJECTION, SOLUTION INTRAVENOUS at 13:20

## 2021-02-23 RX ADMIN — LIDOCAINE HYDROCHLORIDE 0.5 ML: 10 INJECTION, SOLUTION INFILTRATION; PERINEURAL at 13:20

## 2021-02-23 RX ADMIN — CYCLOPENTOLATE HYDROCHLORIDE 1 DROP: 10 SOLUTION/ DROPS OPHTHALMIC at 13:19

## 2021-02-23 RX ADMIN — PHENYLEPHRINE HYDROCHLORIDE: 2.5 SOLUTION/ DROPS OPHTHALMIC at 13:19

## 2021-02-23 RX ADMIN — MOXIFLOXACIN HYDROCHLORIDE: 5 SOLUTION/ DROPS OPHTHALMIC at 13:20

## 2021-02-23 RX ADMIN — LIDOCAINE HYDROCHLORIDE 0.5 ML: 10 INJECTION, SOLUTION INFILTRATION; PERINEURAL at 13:17

## 2021-02-23 RX ADMIN — FLURBIPROFEN SODIUM: 0.3 SOLUTION/ DROPS OPHTHALMIC at 13:19

## 2021-02-23 ASSESSMENT — FIBROSIS 4 INDEX: FIB4 SCORE: 1.02

## 2021-02-23 NOTE — PROGRESS NOTES
1145-Pt's BP elevated in pre-op.  Retaken after pt settled and it was 188/116.  Pt is asymptomatic and states she has never had high blood pressure that she knows of and doesn't take any blood pressure medications.  Notified Dr. Quezada and Dr. Curry, they stated to get patient ready and do her eye drops and they will check in on her later.      1330-Pt's BP still elevated 197/105, Dr. Curry and Dr. Quezada discussed it with patient and decided since this is not an emergent case to cancel.  Patient remains asymptomatic so Dr. Curry states she does not need to go to the ER but needs to make an appointment with her primary doctor right away and he directed her to start keeping a log of her blood pressure at home (patient has a monitor).  Pt verbalizes understanding.  Son in law notified, he was out in parking lot, IV dc'd and patient discharged to son in law.  Pt told not to drive due to right eye being dilated.

## 2021-03-02 ENCOUNTER — OFFICE VISIT (OUTPATIENT)
Dept: MEDICAL GROUP | Facility: PHYSICIAN GROUP | Age: 66
End: 2021-03-02
Payer: COMMERCIAL

## 2021-03-02 VITALS
OXYGEN SATURATION: 97 % | HEIGHT: 65 IN | DIASTOLIC BLOOD PRESSURE: 92 MMHG | RESPIRATION RATE: 16 BRPM | HEART RATE: 74 BPM | BODY MASS INDEX: 44.15 KG/M2 | SYSTOLIC BLOOD PRESSURE: 146 MMHG | TEMPERATURE: 97.5 F | WEIGHT: 265 LBS

## 2021-03-02 DIAGNOSIS — I10 ESSENTIAL HYPERTENSION: ICD-10-CM

## 2021-03-02 PROCEDURE — 99214 OFFICE O/P EST MOD 30 MIN: CPT | Performed by: NURSE PRACTITIONER

## 2021-03-02 RX ORDER — LOSARTAN POTASSIUM 25 MG/1
25 TABLET ORAL DAILY
Qty: 90 TABLET | Refills: 1 | Status: SHIPPED | OUTPATIENT
Start: 2021-03-02 | End: 2021-09-03

## 2021-03-02 RX ORDER — ALPRAZOLAM 0.25 MG/1
0.25 TABLET ORAL 3 TIMES DAILY PRN
COMMUNITY

## 2021-03-02 ASSESSMENT — FIBROSIS 4 INDEX: FIB4 SCORE: 1.02

## 2021-03-02 NOTE — ASSESSMENT & PLAN NOTE
Patient previously diagnosed with hypertension, treated lifestyle modifications  Recently had eye surgery to repair/manage macular degeneration canceled due to her high blood pressure  However she feels that she was under a tremendous amount of stress surrounding that appointment  Today in office just talking about her experience with her eye surgery and have not counseled has caused patient to be irritated, however her blood pressure is elevated in the 160s over 90s  After the visit blood pressure was remeasured and again still elevated  Discussed various causes of hypertension and the consequences of untreated hypertension  She does agree to starting low-dose medication, she will start losartan 25 mg daily and follow-up in 1 week for blood pressure check  She does report to being asymptomatic of this

## 2021-03-02 NOTE — PROGRESS NOTES
Chief Complaint   Patient presents with   • Hypertension       HISTORY OF PRESENT ILLNESS: Patient is a 65 y.o. female established patient who presents today to discuss hypertension    Hypertension  Patient previously diagnosed with hypertension, treated lifestyle modifications  Recently had eye surgery to repair/manage macular degeneration canceled due to her high blood pressure  However she feels that she was under a tremendous amount of stress surrounding that appointment  Today in office just talking about her experience with her eye surgery and have not counseled has caused patient to be irritated, however her blood pressure is elevated in the 160s over 90s  After the visit blood pressure was remeasured and again still elevated  Discussed various causes of hypertension and the consequences of untreated hypertension  She does agree to starting low-dose medication, she will start losartan 25 mg daily and follow-up in 1 week for blood pressure check  She does report to being asymptomatic of this      Patient Active Problem List    Diagnosis Date Noted   • Rash 01/27/2021   • Anxiety 08/28/2020   • Memory loss 08/27/2020   • Primary insomnia 08/27/2020   • Fatigue 09/24/2014   • Weight loss 09/24/2014   • Hypertension 09/16/2014       Allergies:Iodine and Pcn [penicillins]    Current Outpatient Medications   Medication Sig Dispense Refill   • ALPRAZolam (XANAX) 0.25 MG Tab Take 0.25 mg by mouth at bedtime as needed for Sleep.     • losartan (COZAAR) 25 MG Tab Take 1 tablet by mouth every day. 90 tablet 1   • omeprazole (PRILOSEC) 20 MG delayed-release capsule Take 20 mg by mouth every day.     • triamcinolone acetonide (KENALOG) 0.1 % Cream Apply to affected area twice a day for no more than 2 weeks at a time 45 g 1   • TURMERIC PO Take  by mouth.     • VITAMIN D, ERGOCALCIFEROL, PO Take  by mouth.     • Cyanocobalamin (B-12) 1000 MCG Cap Take  by mouth.     • APPLE CIDER VINEGAR PO Take  by mouth.       No current  "facility-administered medications for this visit.       Social History     Tobacco Use   • Smoking status: Never Smoker   • Smokeless tobacco: Never Used   Substance Use Topics   • Alcohol use: No   • Drug use: No       Family Status   Relation Name Status   • Mo     • Sis  Alive   • Bro     • Fa     • PUnc     • MGMo     • MGFa     • PGMo     • PGFa       Family History   Problem Relation Age of Onset   • Diabetes Mother    • Hypertension Mother    • Stroke Mother    • Diabetes Sister    • Hypertension Sister    • Diabetes Brother    • Hypertension Brother    • Cancer Brother         lung, testicular   • Dementia Father 70        Alzheimer's   • Lung Disease Paternal Uncle         emphysema       Review of Systems:   Constitutional: Negative for fever, chills, weight loss and malaise/fatigue.    Respiratory: Negative for cough, sputum production, shortness of breath and wheezing.    Cardiovascular: Positive for elevated blood pressure  Gastrointestinal: Negative for heartburn, nausea, vomiting and abdominal pain.   Genitourinary/Renal: Negative for dysuria, urgency and frequency.   Musculoskeletal: Negative for myalgias, back pain and joint pain.   Skin: Negative for rash and itching.   Neurological: Negative for dizziness, tingling, tremors, sensory change, focal weakness and headaches.   Endo/Heme/Allergies: Does not bruise/bleed easily.       Exam:  /92   Pulse 74   Temp 36.4 °C (97.5 °F) (Temporal)   Resp 16   Ht 1.651 m (5' 5\")   Wt 120 kg (265 lb)   SpO2 97%   General:  Well nourished, well developed female in NAD  Skin: warm, dry, intact, no evidence of rash or concerning lesions  Head: is grossly normal.  HEENT: eyes clear, conjunctiva normal  Pulmonary: Clear to ausculation. Normal effort. No rales, ronchi, or wheezing.  Cardiovascular: Regular rate and rhythm without murmur. Carotid and radial pulses are intact and equal " bilaterally.  Abdomen: soft, non-tender, positive bowel sounds  Musculoskeletal: no clubbing, cyanosis, or edema.  Psych/mental: no depression, anxiety, hallucinations  Neuro: alert, intact, CN 2-12 grossly intact    Medical decision-making and discussion:    As mentioned above, patient agrees to starting antihypertensive and will start losartan 25 mg daily.  Discussed with her the risks, benefits and side effects of medication.  She will return in 1 week for blood sugar check with MA        Assessment/Plan:  Haylee was seen today for hypertension.    Diagnoses and all orders for this visit:    Essential hypertension  -     losartan (COZAAR) 25 MG Tab; Take 1 tablet by mouth every day.         Return in about 3 months (around 6/2/2021) for Follow-up.    Please note that this dictation was created using voice recognition software. I have made every reasonable attempt to correct obvious errors, but I expect that there are errors of grammar and possibly content that I did not discover before finalizing the note.

## 2021-04-23 ENCOUNTER — PRE-ADMISSION TESTING (OUTPATIENT)
Dept: ADMISSIONS | Facility: MEDICAL CENTER | Age: 66
End: 2021-04-23
Attending: OPHTHALMOLOGY
Payer: COMMERCIAL

## 2021-04-23 DIAGNOSIS — Z01.810 PRE-OPERATIVE CARDIOVASCULAR EXAMINATION: ICD-10-CM

## 2021-04-23 DIAGNOSIS — Z01.812 PRE-OPERATIVE LABORATORY EXAMINATION: ICD-10-CM

## 2021-04-23 LAB
ANION GAP SERPL CALC-SCNC: 7 MMOL/L (ref 7–16)
BUN SERPL-MCNC: 14 MG/DL (ref 8–22)
CALCIUM SERPL-MCNC: 9.6 MG/DL (ref 8.5–10.5)
CHLORIDE SERPL-SCNC: 106 MMOL/L (ref 96–112)
CO2 SERPL-SCNC: 28 MMOL/L (ref 20–33)
CREAT SERPL-MCNC: 0.54 MG/DL (ref 0.5–1.4)
EKG IMPRESSION: NORMAL
GLUCOSE SERPL-MCNC: 80 MG/DL (ref 65–99)
POTASSIUM SERPL-SCNC: 4.6 MMOL/L (ref 3.6–5.5)
SARS-COV-2 RNA RESP QL NAA+PROBE: NOTDETECTED
SODIUM SERPL-SCNC: 141 MMOL/L (ref 135–145)
SPECIMEN SOURCE: NORMAL

## 2021-04-23 PROCEDURE — U0003 INFECTIOUS AGENT DETECTION BY NUCLEIC ACID (DNA OR RNA); SEVERE ACUTE RESPIRATORY SYNDROME CORONAVIRUS 2 (SARS-COV-2) (CORONAVIRUS DISEASE [COVID-19]), AMPLIFIED PROBE TECHNIQUE, MAKING USE OF HIGH THROUGHPUT TECHNOLOGIES AS DESCRIBED BY CMS-2020-01-R: HCPCS

## 2021-04-23 PROCEDURE — U0005 INFEC AGEN DETEC AMPLI PROBE: HCPCS

## 2021-04-23 PROCEDURE — C9803 HOPD COVID-19 SPEC COLLECT: HCPCS

## 2021-04-23 PROCEDURE — 80048 BASIC METABOLIC PNL TOTAL CA: CPT

## 2021-04-23 PROCEDURE — 36415 COLL VENOUS BLD VENIPUNCTURE: CPT

## 2021-04-23 PROCEDURE — 93010 ELECTROCARDIOGRAM REPORT: CPT | Performed by: INTERNAL MEDICINE

## 2021-04-23 PROCEDURE — 93005 ELECTROCARDIOGRAM TRACING: CPT

## 2021-04-23 RX ORDER — IBUPROFEN 400 MG/1
400 TABLET ORAL PRN
COMMUNITY
End: 2024-03-18

## 2021-04-23 RX ORDER — MV-MIN/FOLIC/VIT K/LUT/HERB293 240-120MCG
1 TABLET ORAL DAILY
COMMUNITY
End: 2024-03-18

## 2021-04-23 ASSESSMENT — FIBROSIS 4 INDEX: FIB4 SCORE: 1.02

## 2021-04-26 NOTE — OR NURSING
COVID-19 Pre-surgery screening:    Pt did not answer and their voicemail box has not been set up.

## 2021-04-27 ENCOUNTER — ANESTHESIA (OUTPATIENT)
Dept: SURGERY | Facility: MEDICAL CENTER | Age: 66
End: 2021-04-27
Payer: COMMERCIAL

## 2021-04-27 ENCOUNTER — HOSPITAL ENCOUNTER (OUTPATIENT)
Facility: MEDICAL CENTER | Age: 66
End: 2021-04-27
Attending: OPHTHALMOLOGY | Admitting: OPHTHALMOLOGY
Payer: COMMERCIAL

## 2021-04-27 ENCOUNTER — ANESTHESIA EVENT (OUTPATIENT)
Dept: SURGERY | Facility: MEDICAL CENTER | Age: 66
End: 2021-04-27
Payer: COMMERCIAL

## 2021-04-27 VITALS
HEIGHT: 65 IN | WEIGHT: 257.94 LBS | DIASTOLIC BLOOD PRESSURE: 77 MMHG | TEMPERATURE: 98.1 F | OXYGEN SATURATION: 95 % | HEART RATE: 78 BPM | BODY MASS INDEX: 42.98 KG/M2 | SYSTOLIC BLOOD PRESSURE: 148 MMHG | RESPIRATION RATE: 18 BRPM

## 2021-04-27 PROCEDURE — 700101 HCHG RX REV CODE 250: Performed by: ANESTHESIOLOGY

## 2021-04-27 PROCEDURE — 700111 HCHG RX REV CODE 636 W/ 250 OVERRIDE (IP): Performed by: OPHTHALMOLOGY

## 2021-04-27 PROCEDURE — 160046 HCHG PACU - 1ST 60 MINS PHASE II: Performed by: OPHTHALMOLOGY

## 2021-04-27 PROCEDURE — A6410 STERILE EYE PAD: HCPCS | Performed by: OPHTHALMOLOGY

## 2021-04-27 PROCEDURE — 160036 HCHG PACU - EA ADDL 30 MINS PHASE I: Performed by: OPHTHALMOLOGY

## 2021-04-27 PROCEDURE — 160029 HCHG SURGERY MINUTES - 1ST 30 MINS LEVEL 4: Performed by: OPHTHALMOLOGY

## 2021-04-27 PROCEDURE — 700102 HCHG RX REV CODE 250 W/ 637 OVERRIDE(OP): Performed by: ANESTHESIOLOGY

## 2021-04-27 PROCEDURE — 700111 HCHG RX REV CODE 636 W/ 250 OVERRIDE (IP): Performed by: ANESTHESIOLOGY

## 2021-04-27 PROCEDURE — 160048 HCHG OR STATISTICAL LEVEL 1-5: Performed by: OPHTHALMOLOGY

## 2021-04-27 PROCEDURE — 700101 HCHG RX REV CODE 250: Performed by: OPHTHALMOLOGY

## 2021-04-27 PROCEDURE — 160025 RECOVERY II MINUTES (STATS): Performed by: OPHTHALMOLOGY

## 2021-04-27 PROCEDURE — 160035 HCHG PACU - 1ST 60 MINS PHASE I: Performed by: OPHTHALMOLOGY

## 2021-04-27 PROCEDURE — 160041 HCHG SURGERY MINUTES - EA ADDL 1 MIN LEVEL 4: Performed by: OPHTHALMOLOGY

## 2021-04-27 PROCEDURE — 160009 HCHG ANES TIME/MIN: Performed by: OPHTHALMOLOGY

## 2021-04-27 PROCEDURE — 700101 HCHG RX REV CODE 250

## 2021-04-27 PROCEDURE — 501838 HCHG SUTURE GENERAL: Performed by: OPHTHALMOLOGY

## 2021-04-27 PROCEDURE — 503198 HCHG BACKFLUSH, SOFT TIP: Performed by: OPHTHALMOLOGY

## 2021-04-27 PROCEDURE — 500558 HCHG EYE SHIELD W/GARTER (FOX): Performed by: OPHTHALMOLOGY

## 2021-04-27 PROCEDURE — 700105 HCHG RX REV CODE 258: Performed by: OPHTHALMOLOGY

## 2021-04-27 PROCEDURE — A9270 NON-COVERED ITEM OR SERVICE: HCPCS | Performed by: ANESTHESIOLOGY

## 2021-04-27 PROCEDURE — 160002 HCHG RECOVERY MINUTES (STAT): Performed by: OPHTHALMOLOGY

## 2021-04-27 RX ORDER — BALANCED SALT SOLUTION 6.4; .75; .48; .3; 3.9; 1.7 MG/ML; MG/ML; MG/ML; MG/ML; MG/ML; MG/ML
SOLUTION OPHTHALMIC
Status: DISCONTINUED | OUTPATIENT
Start: 2021-04-27 | End: 2021-04-27 | Stop reason: HOSPADM

## 2021-04-27 RX ORDER — KETOROLAC TROMETHAMINE 30 MG/ML
INJECTION, SOLUTION INTRAMUSCULAR; INTRAVENOUS PRN
Status: DISCONTINUED | OUTPATIENT
Start: 2021-04-27 | End: 2021-04-27 | Stop reason: SURG

## 2021-04-27 RX ORDER — DEXAMETHASONE SODIUM PHOSPHATE 4 MG/ML
INJECTION, SOLUTION INTRA-ARTICULAR; INTRALESIONAL; INTRAMUSCULAR; INTRAVENOUS; SOFT TISSUE
Status: DISCONTINUED | OUTPATIENT
Start: 2021-04-27 | End: 2021-04-27 | Stop reason: HOSPADM

## 2021-04-27 RX ORDER — BALANCED SALT SOLUTION ENRICHED WITH BICARBONATE, DEXTROSE, AND GLUTATHIONE
KIT INTRAOCULAR
Status: DISCONTINUED | OUTPATIENT
Start: 2021-04-27 | End: 2021-04-27 | Stop reason: HOSPADM

## 2021-04-27 RX ORDER — DIPHENHYDRAMINE HYDROCHLORIDE 50 MG/ML
12.5 INJECTION INTRAMUSCULAR; INTRAVENOUS
Status: DISCONTINUED | OUTPATIENT
Start: 2021-04-27 | End: 2021-04-27 | Stop reason: HOSPADM

## 2021-04-27 RX ORDER — ONDANSETRON 2 MG/ML
INJECTION INTRAMUSCULAR; INTRAVENOUS PRN
Status: DISCONTINUED | OUTPATIENT
Start: 2021-04-27 | End: 2021-04-27 | Stop reason: SURG

## 2021-04-27 RX ORDER — CYCLOPENTOLATE HYDROCHLORIDE 10 MG/ML
SOLUTION/ DROPS OPHTHALMIC
Status: COMPLETED
Start: 2021-04-27 | End: 2021-04-27

## 2021-04-27 RX ORDER — METOPROLOL TARTRATE 1 MG/ML
1 INJECTION, SOLUTION INTRAVENOUS
Status: DISCONTINUED | OUTPATIENT
Start: 2021-04-27 | End: 2021-04-27 | Stop reason: HOSPADM

## 2021-04-27 RX ORDER — HYDROMORPHONE HYDROCHLORIDE 1 MG/ML
0.2 INJECTION, SOLUTION INTRAMUSCULAR; INTRAVENOUS; SUBCUTANEOUS
Status: DISCONTINUED | OUTPATIENT
Start: 2021-04-27 | End: 2021-04-27 | Stop reason: HOSPADM

## 2021-04-27 RX ORDER — ONDANSETRON 2 MG/ML
4 INJECTION INTRAMUSCULAR; INTRAVENOUS
Status: DISCONTINUED | OUTPATIENT
Start: 2021-04-27 | End: 2021-04-27 | Stop reason: HOSPADM

## 2021-04-27 RX ORDER — HYDRALAZINE HYDROCHLORIDE 20 MG/ML
5 INJECTION INTRAMUSCULAR; INTRAVENOUS
Status: DISCONTINUED | OUTPATIENT
Start: 2021-04-27 | End: 2021-04-27 | Stop reason: HOSPADM

## 2021-04-27 RX ORDER — LABETALOL HYDROCHLORIDE 5 MG/ML
10 INJECTION, SOLUTION INTRAVENOUS ONCE
Status: COMPLETED | OUTPATIENT
Start: 2021-04-27 | End: 2021-04-27

## 2021-04-27 RX ORDER — HYDRALAZINE HYDROCHLORIDE 20 MG/ML
INJECTION INTRAMUSCULAR; INTRAVENOUS PRN
Status: DISCONTINUED | OUTPATIENT
Start: 2021-04-27 | End: 2021-04-27 | Stop reason: SURG

## 2021-04-27 RX ORDER — OXYCODONE HCL 5 MG/5 ML
5 SOLUTION, ORAL ORAL
Status: DISCONTINUED | OUTPATIENT
Start: 2021-04-27 | End: 2021-04-27 | Stop reason: HOSPADM

## 2021-04-27 RX ORDER — DEXAMETHASONE SODIUM PHOSPHATE 4 MG/ML
INJECTION, SOLUTION INTRA-ARTICULAR; INTRALESIONAL; INTRAMUSCULAR; INTRAVENOUS; SOFT TISSUE PRN
Status: DISCONTINUED | OUTPATIENT
Start: 2021-04-27 | End: 2021-04-27 | Stop reason: SURG

## 2021-04-27 RX ORDER — SODIUM CHLORIDE, SODIUM LACTATE, POTASSIUM CHLORIDE, CALCIUM CHLORIDE 600; 310; 30; 20 MG/100ML; MG/100ML; MG/100ML; MG/100ML
INJECTION, SOLUTION INTRAVENOUS CONTINUOUS
Status: DISCONTINUED | OUTPATIENT
Start: 2021-04-27 | End: 2021-04-27 | Stop reason: HOSPADM

## 2021-04-27 RX ORDER — HALOPERIDOL 5 MG/ML
1 INJECTION INTRAMUSCULAR
Status: DISCONTINUED | OUTPATIENT
Start: 2021-04-27 | End: 2021-04-27 | Stop reason: HOSPADM

## 2021-04-27 RX ORDER — FLURBIPROFEN SODIUM 0.3 MG/ML
SOLUTION/ DROPS OPHTHALMIC
Status: COMPLETED
Start: 2021-04-27 | End: 2021-04-27

## 2021-04-27 RX ORDER — HYDROMORPHONE HYDROCHLORIDE 1 MG/ML
0.4 INJECTION, SOLUTION INTRAMUSCULAR; INTRAVENOUS; SUBCUTANEOUS
Status: DISCONTINUED | OUTPATIENT
Start: 2021-04-27 | End: 2021-04-27 | Stop reason: HOSPADM

## 2021-04-27 RX ORDER — MIDAZOLAM HYDROCHLORIDE 1 MG/ML
INJECTION INTRAMUSCULAR; INTRAVENOUS PRN
Status: DISCONTINUED | OUTPATIENT
Start: 2021-04-27 | End: 2021-04-27 | Stop reason: SURG

## 2021-04-27 RX ORDER — BALANCED SALT SOLUTION ENRICHED WITH BICARBONATE, DEXTROSE, AND GLUTATHIONE
KIT INTRAOCULAR
Status: DISCONTINUED
Start: 2021-04-27 | End: 2021-04-27 | Stop reason: HOSPADM

## 2021-04-27 RX ORDER — ACETAMINOPHEN 500 MG
1000 TABLET ORAL ONCE
Status: COMPLETED | OUTPATIENT
Start: 2021-04-27 | End: 2021-04-27

## 2021-04-27 RX ORDER — PHENYLEPHRINE HYDROCHLORIDE 25 MG/ML
SOLUTION/ DROPS OPHTHALMIC
Status: COMPLETED
Start: 2021-04-27 | End: 2021-04-27

## 2021-04-27 RX ORDER — MIDAZOLAM HYDROCHLORIDE 1 MG/ML
1 INJECTION INTRAMUSCULAR; INTRAVENOUS
Status: DISCONTINUED | OUTPATIENT
Start: 2021-04-27 | End: 2021-04-27 | Stop reason: HOSPADM

## 2021-04-27 RX ORDER — HYDROMORPHONE HYDROCHLORIDE 1 MG/ML
0.1 INJECTION, SOLUTION INTRAMUSCULAR; INTRAVENOUS; SUBCUTANEOUS
Status: DISCONTINUED | OUTPATIENT
Start: 2021-04-27 | End: 2021-04-27 | Stop reason: HOSPADM

## 2021-04-27 RX ORDER — SODIUM CHLORIDE, SODIUM LACTATE, POTASSIUM CHLORIDE, CALCIUM CHLORIDE 600; 310; 30; 20 MG/100ML; MG/100ML; MG/100ML; MG/100ML
INJECTION, SOLUTION INTRAVENOUS CONTINUOUS
Status: ACTIVE | OUTPATIENT
Start: 2021-04-27 | End: 2021-04-27

## 2021-04-27 RX ORDER — LABETALOL HYDROCHLORIDE 5 MG/ML
5 INJECTION, SOLUTION INTRAVENOUS
Status: DISCONTINUED | OUTPATIENT
Start: 2021-04-27 | End: 2021-04-27 | Stop reason: HOSPADM

## 2021-04-27 RX ORDER — LIDOCAINE HYDROCHLORIDE 20 MG/ML
INJECTION, SOLUTION EPIDURAL; INFILTRATION; INTRACAUDAL; PERINEURAL PRN
Status: DISCONTINUED | OUTPATIENT
Start: 2021-04-27 | End: 2021-04-27 | Stop reason: SURG

## 2021-04-27 RX ORDER — MOXIFLOXACIN 5 MG/ML
SOLUTION/ DROPS OPHTHALMIC
Status: COMPLETED
Start: 2021-04-27 | End: 2021-04-27

## 2021-04-27 RX ORDER — OXYCODONE HCL 5 MG/5 ML
10 SOLUTION, ORAL ORAL
Status: DISCONTINUED | OUTPATIENT
Start: 2021-04-27 | End: 2021-04-27 | Stop reason: HOSPADM

## 2021-04-27 RX ORDER — MEPERIDINE HYDROCHLORIDE 25 MG/ML
12.5 INJECTION INTRAMUSCULAR; INTRAVENOUS; SUBCUTANEOUS
Status: DISCONTINUED | OUTPATIENT
Start: 2021-04-27 | End: 2021-04-27 | Stop reason: HOSPADM

## 2021-04-27 RX ORDER — VANCOMYCIN HYDROCHLORIDE 500 MG/10ML
INJECTION, POWDER, LYOPHILIZED, FOR SOLUTION INTRAVENOUS
Status: COMPLETED | OUTPATIENT
Start: 2021-04-27 | End: 2021-04-27

## 2021-04-27 RX ORDER — TROPICAMIDE 10 MG/ML
SOLUTION/ DROPS OPHTHALMIC
Status: COMPLETED
Start: 2021-04-27 | End: 2021-04-27

## 2021-04-27 RX ADMIN — KETOROLAC TROMETHAMINE 30 MG: 30 INJECTION, SOLUTION INTRAMUSCULAR at 13:34

## 2021-04-27 RX ADMIN — PHENYLEPHRINE HYDROCHLORIDE 1 DROP: 2.5 SOLUTION/ DROPS OPHTHALMIC at 10:55

## 2021-04-27 RX ADMIN — ONDANSETRON 4 MG: 2 INJECTION INTRAMUSCULAR; INTRAVENOUS at 13:31

## 2021-04-27 RX ADMIN — FENTANYL CITRATE 50 MCG: 50 INJECTION, SOLUTION INTRAMUSCULAR; INTRAVENOUS at 13:09

## 2021-04-27 RX ADMIN — TROPICAMIDE 1 DROP: 10 SOLUTION/ DROPS OPHTHALMIC at 10:55

## 2021-04-27 RX ADMIN — FLURBIPROFEN SODIUM 1 DROP: 0.3 SOLUTION/ DROPS OPHTHALMIC at 11:10

## 2021-04-27 RX ADMIN — DEXAMETHASONE SODIUM PHOSPHATE 4 MG: 4 INJECTION, SOLUTION INTRA-ARTICULAR; INTRALESIONAL; INTRAMUSCULAR; INTRAVENOUS; SOFT TISSUE at 12:57

## 2021-04-27 RX ADMIN — SODIUM CHLORIDE, POTASSIUM CHLORIDE, SODIUM LACTATE AND CALCIUM CHLORIDE: 600; 310; 30; 20 INJECTION, SOLUTION INTRAVENOUS at 10:44

## 2021-04-27 RX ADMIN — FENTANYL CITRATE 50 MCG: 50 INJECTION, SOLUTION INTRAMUSCULAR; INTRAVENOUS at 12:57

## 2021-04-27 RX ADMIN — HYDRALAZINE HYDROCHLORIDE 10 MG: 20 INJECTION INTRAMUSCULAR; INTRAVENOUS at 13:24

## 2021-04-27 RX ADMIN — MOXIFLOXACIN HYDROCHLORIDE 1 DROP: 5 SOLUTION/ DROPS OPHTHALMIC at 11:10

## 2021-04-27 RX ADMIN — CYCLOPENTOLATE HYDROCHLORIDE 1 DROP: 10 SOLUTION/ DROPS OPHTHALMIC at 10:55

## 2021-04-27 RX ADMIN — PHENYLEPHRINE HYDROCHLORIDE 1 DROP: 2.5 SOLUTION/ DROPS OPHTHALMIC at 11:10

## 2021-04-27 RX ADMIN — CYCLOPENTOLATE HYDROCHLORIDE 1 DROP: 10 SOLUTION/ DROPS OPHTHALMIC at 10:28

## 2021-04-27 RX ADMIN — CYCLOPENTOLATE HYDROCHLORIDE 1 DROP: 10 SOLUTION/ DROPS OPHTHALMIC at 11:10

## 2021-04-27 RX ADMIN — TROPICAMIDE 1 DROP: 10 SOLUTION/ DROPS OPHTHALMIC at 11:10

## 2021-04-27 RX ADMIN — PROPOFOL 150 MG: 10 INJECTION, EMULSION INTRAVENOUS at 12:57

## 2021-04-27 RX ADMIN — ACETAMINOPHEN 1000 MG: 500 TABLET ORAL at 10:25

## 2021-04-27 RX ADMIN — MOXIFLOXACIN HYDROCHLORIDE 1 DROP: 5 SOLUTION/ DROPS OPHTHALMIC at 10:55

## 2021-04-27 RX ADMIN — FLURBIPROFEN SODIUM 1 DROP: 0.3 SOLUTION/ DROPS OPHTHALMIC at 10:29

## 2021-04-27 RX ADMIN — PHENYLEPHRINE HYDROCHLORIDE 1 DROP: 2.5 SOLUTION/ DROPS OPHTHALMIC at 10:28

## 2021-04-27 RX ADMIN — PROPOFOL 30 MG: 10 INJECTION, EMULSION INTRAVENOUS at 13:34

## 2021-04-27 RX ADMIN — MOXIFLOXACIN HYDROCHLORIDE 1 DROP: 5 SOLUTION/ DROPS OPHTHALMIC at 10:29

## 2021-04-27 RX ADMIN — TROPICAMIDE 1 DROP: 10 SOLUTION/ DROPS OPHTHALMIC at 10:28

## 2021-04-27 RX ADMIN — LIDOCAINE HYDROCHLORIDE 100 MG: 20 INJECTION, SOLUTION EPIDURAL; INFILTRATION; INTRACAUDAL at 12:57

## 2021-04-27 RX ADMIN — MIDAZOLAM HYDROCHLORIDE 2 MG: 1 INJECTION, SOLUTION INTRAMUSCULAR; INTRAVENOUS at 12:49

## 2021-04-27 RX ADMIN — FLURBIPROFEN SODIUM 1 DROP: 0.3 SOLUTION/ DROPS OPHTHALMIC at 10:55

## 2021-04-27 RX ADMIN — LABETALOL HYDROCHLORIDE 10 MG: 5 INJECTION, SOLUTION INTRAVENOUS at 10:47

## 2021-04-27 ASSESSMENT — PAIN DESCRIPTION - PAIN TYPE
TYPE: SURGICAL PAIN

## 2021-04-27 ASSESSMENT — PAIN SCALES - GENERAL: PAIN_LEVEL: 0

## 2021-04-27 ASSESSMENT — FIBROSIS 4 INDEX: FIB4 SCORE: 1.02

## 2021-04-27 NOTE — ANESTHESIA POSTPROCEDURE EVALUATION
Patient: Haylee Jean-Baptiste    Procedure Summary     Date: 04/27/21 Room / Location: [facility] ROOM 24 / SURGERY SAME DAY [facility]    Anesthesia Start: 1251 Anesthesia Stop: 1343    Procedure: VITRECTOMY, POSTERIOR PORTION-MEMBRANE PEEL, ENDO LASER,  LASER GAS SF6  (Right Eye) Diagnosis: (MACULAR HOLE, RIGHT EYE)    Surgeons: Alize Quezada M.D. Responsible Provider: Ivan Fitzgerald M.D.    Anesthesia Type: general ASA Status: 3          Final Anesthesia Type: general  Last vitals  BP   Blood Pressure : 143/66    Temp   36.3 °C (97.3 °F)    Pulse   77   Resp   20    SpO2   96 %      Anesthesia Post Evaluation    Patient location during evaluation: PACU  Patient participation: complete - patient participated  Level of consciousness: awake and alert  Pain score: 0    Airway patency: patent  Anesthetic complications: no  Cardiovascular status: hemodynamically stable  Respiratory status: acceptable  Hydration status: euvolemic    PONV: none          No complications documented.     Nurse Pain Score: 0 (NPRS)

## 2021-04-27 NOTE — OR NURSING
Pt arrives from phase after report, already dressed, placed in recliner.  Friend brought to bedside, d/c instructions provided, all questions answered.  Iv removed, pt taken out via w/c.

## 2021-04-27 NOTE — ANESTHESIA PREPROCEDURE EVALUATION
Relevant Problems   CARDIAC   (+) Hypertension       Physical Exam    Airway   Mallampati: II  TM distance: >3 FB  Neck ROM: full       Cardiovascular - normal exam  Rhythm: regular  Rate: normal  (-) murmur     Dental - normal exam      Very poor dentition   Pulmonary - normal exam  Breath sounds clear to auscultation     Abdominal   (+) obese     Neurological - normal exam               Anesthesia Plan    ASA 3   ASA physical status 3 criteria: morbid obesity - BMI greater than or equal to 40    Plan - general       Airway plan will be LMA          Induction: intravenous    Postoperative Plan: Postoperative administration of opioids is intended.    Pertinent diagnostic labs and testing reviewed    Informed Consent:    Anesthetic plan and risks discussed with patient.    Use of blood products discussed with: patient whom consented to blood products.

## 2021-04-27 NOTE — OP REPORT
SURGEON:  Alize Quezada MD      PROCEDURES:  A 23-gauge pars plana vitrectomy, ILM peel, endolaser, 20% SF6 right eye     PREOPERATIVE DIAGNOSIS: Macular Hole right eye       POSTOPERATIVE DIAGNOSIS:  Macular Hole right eye     ANESTHESIA:  General endotracheal.       FLUIDS:  See anesthesia note.       COMPLICATIONS:  None.       ANESTHESIA:  General endotracheal.       FLUIDS:  See anesthesia note.       COMPLICATIONS:  None.       DETAILS OF THE PROCEDURE:  Correct eye was marked in the preoperative area.    Patient was taken to the operating room.  General anesthesia induced by anesthesiology and the patient was prepped and draped in the usual sterile  ophthalmic fashion.  Site was marked 3 mm from the limbus in the inferotemporal quadrant.  A 23-gauge trocar was used in a beveled fashion to enter the vitreous cavity.  Infusion was placed in the eye, visualized with the light pipe and turned on.  One site superonasally and another site superotemporally were then marked 3 mm from the limbus. We performed core and peripheral vitrectomy. We then used 23G ILM forceps to peel the ILM without any complications. Scleral depression was then performed there were no tears but old michael and lasered it. Air fluid exchange was then performed and 20% SF6 was placed in the eye. Trocars were the removed and postop ancef and dexmethasone were then injected. The eye was cleaned and patched. Patient was taken to recovery room without any complications.

## 2021-04-27 NOTE — DISCHARGE INSTRUCTIONS
ACTIVITY: Rest and take it easy for the first 24 hours.  A responsible adult is recommended to remain with you during that time.  It is normal to feel sleepy.  We encourage you to not do anything that requires balance, judgment or coordination.    MILD FLU-LIKE SYMPTOMS ARE NORMAL. YOU MAY EXPERIENCE GENERALIZED MUSCLE ACHES, THROAT IRRITATION, HEADACHE AND/OR SOME NAUSEA.    FOR 24 HOURS DO NOT:  Drive, operate machinery or run household appliances.  Drink beer or alcoholic beverages.   Make important decisions or sign legal documents.    SPECIAL INSTRUCTIONS: FOLLOW INSTRUCTIONS FROM DR. REDDY. KEEP FACE DOWN AT ALL TIMES EXCEPT MAY SLEEP ON EITHER SIDE.    DIET: To avoid nausea, slowly advance diet as tolerated, avoiding spicy or greasy foods for the first day.  Add more substantial food to your diet according to your physician's instructions.  Babies can be fed formula or breast milk as soon as they are hungry.  INCREASE FLUIDS AND FIBER TO AVOID CONSTIPATION.    SURGICAL DRESSING/BATHING: *KEEP EYE SHIELD ON, KEEP EYE DRY.**    FOLLOW-UP APPOINTMENT:  A follow-up appointment should be arranged with your doctor in *FOLLOW UP TOMORROW IN DR. REDDY'S OFFICE TOMORROW AS SCHEDULED.*    You should CALL YOUR PHYSICIAN if you develop:  Fever greater than 101 degrees F.  Pain not relieved by medication, or persistent nausea or vomiting.  Excessive bleeding (blood soaking through dressing) or unexpected drainage from the wound.  Extreme redness or swelling around the incision site, drainage of pus or foul smelling drainage.  Inability to urinate or empty your bladder within 8 hours.  Problems with breathing or chest pain.    You should call 911 if you develop problems with breathing or chest pain.  If you are unable to contact your doctor or surgical center, you should go to the nearest emergency room or urgent care center.  Physician's telephone #: **[phone]*    If any questions arise, call your doctor.  If your  doctor is not available, please feel free to call the Surgical Center at [phone]. The Contact Center is open Monday through Friday 7AM to 5PM and may speak to a nurse at [phone], or toll free at [phone].     A registered nurse may call you a few days after your surgery to see how you are doing after your procedure.    MEDICATIONS: Resume taking daily medication.  Take prescribed pain medication with food.  If no medication is prescribed, you may take non-aspirin pain medication if needed.  PAIN MEDICATION CAN BE VERY CONSTIPATING.  Take a stool softener or laxative such as senokot, pericolace, or milk of magnesia if needed.    Prescription given for *NONE**.  Last pain medication given at **NONE GIVEN*.    If your physician has prescribed pain medication that includes Acetaminophen (Tylenol), do not take additional Acetaminophen (Tylenol) while taking the prescribed medication.    Depression / Suicide Risk    As you are discharged from this [facility] facility, it is important to learn how to keep safe from harming yourself.    Recognize the warning signs:  · Abrupt changes in personality, positive or negative- including increase in energy   · Giving away possessions  · Change in eating patterns- significant weight changes-  positive or negative  · Change in sleeping patterns- unable to sleep or sleeping all the time   · Unwillingness or inability to communicate  · Depression  · Unusual sadness, discouragement and loneliness  · Talk of wanting to die  · Neglect of personal appearance   · Rebelliousness- reckless behavior  · Withdrawal from people/activities they love  · Confusion- inability to concentrate     If you or a loved one observes any of these behaviors or has concerns about self-harm, here's what you can do:  · Talk about it- your feelings and reasons for harming yourself  · Remove any means that you might use to hurt yourself (examples: pills, rope, extension cords, firearm)  · Get  professional help from the community (Mental Health, Substance Abuse, psychological counseling)  · Do not be alone:Call your Safe Contact- someone whom you trust who will be there for you.  · Call your local CRISIS HOTLINE 542-6652 or 702-327-8562  · Call your local Children's Mobile Crisis Response Team [place] (011) 242-9978 or www.[employer]  · Call the toll free National Suicide Prevention Hotlines   · National Suicide Prevention Lifeline 700-479-QCUR (6649)  [place] [facility] 800-SUICIDE (814-0459)RETINAL DETACHMENT OR VITRECTOMY INSTRUCTIONS    These instructions are provided so that you can have the best chance for a successful recovery from your recent eye surgery. In general, they will remain in effect for at least two to three weeks following your operation.   Please call my office to see me within one week following your discharge from the hospital. You can make this appointment by calling my office. Call Monday through Friday from 8:00 am to 5:00 pm. For patients who live a great distance from my office, I may ask you to make arrangements with your local ophthalmologist for follow up care instead of returning here.     Medications:     There medications are to be used while you are awake. A good night's sleep is an important part of the healing process.     1.   Tobradex, one drop in the operated eye every 4 hours.     2.   Tylenol should be sufficient for any pain; if not, please call my office.     3.   Other medications as directed.     Eye Care:    After leaving the hospital, a patch over the operated eye is considered optional. If a patch is worn at home, it should be changed at least once daily. These patches can usually be purchased at your local drug store.   If the eyelids become stuck together because of discharge, gently wash them with a clean damp washcloth moistened with warm tap water. It is advisable to wash under the eye with a damp washcloth at least once a day; be  careful not to touch or press on the eye itself.     Appearance and sensation in the operated eye:    1.   It is normal for the operated eye to remain somewhat red, swollen and slightly irritated for four to six weeks.     2.   It is expected that there will be an increased amount of tearing and mattering in the operated eye.     Return of eyesight:     1.   You final best vision will not be known until the eye and retina are completely healed, which takes at least two to three months and sometimes much longer.     2.   Following this type of surgery, you may require a change in the prescription for your glasses or contact lenses. In general, checking for a new prescription is not done until at least two to three months following your surgery.     Physical Activities:    Things to Avoid:    1.   Aspirin  2.   Lifting or moving heavy objects (20 pounds or more)  3.   Rubbing the operated eye.   4.   Strenuous or jarring physical exertion such as running, jumping, aerobics and swimming.   5.   Driving a car.  6.   Garden or yard work.  7.   Wearing of contact lenses in the operated eye for 4-6 weeks.  8.   Returning to work or school; depending upon the nature of eye surgery and occupation, I will advise you to refrain from returning to school or work for anywhere from 2-8 weeks.   9.   Air travel unless otherwise instructed.   10. Reading unless otherwise instructed.     Permissible Activities:    1.   Watching television and using your eyes in moderation.   2.   Cooking and light housework.   3.   Riding in a car on paved roads.  4.   Showering, bathing and shaving; however, avoid getting water in your eyes.     Indications for calling my office:     1.   Increased swelling or redness of the eyelids.  2.   Increased persistent pain in the eye which is not relieved by Tylenol or which does not go away within 24 hours.   3.   Decreased vision.         ·

## 2021-04-27 NOTE — OR NURSING
1340  Pt received to pacu, asleep with oral airway in place and spontaneous respirations noted.  O2 applied and no distress noted. Report received from anesthesia.  Vital signs taken and stable. Shield intact to right eye, taped securely in place.     1343  Pt waking up, oral airway dc'd. Pt returned to sleep. Blood pressure stable.    1420  Pt waking up, denies pain and nausea.    1430 Pt assisted up to get dressed, voided in bathroom.    1443  Pt moved to phase 2, handoff report to Ximena HERNANDES.

## 2021-04-27 NOTE — ANESTHESIA TIME REPORT
Anesthesia Start and Stop Event Times     Date Time Event    4/27/2021 1234 Ready for Procedure     1251 Anesthesia Start     1343 Anesthesia Stop        Responsible Staff  04/27/21    Name Role Begin End    Ivan Fitzgerald M.D. Anesth 1251 1343        Preop Diagnosis (Free Text):  Pre-op Diagnosis     MACULAR HOLE, RIGHT EYE        Preop Diagnosis (Codes):    Post op Diagnosis  Macular hole, right eye      Premium Reason  Non-Premium    Comments:

## 2021-04-27 NOTE — ANESTHESIA PROCEDURE NOTES
Airway    Date/Time: 4/27/2021 12:59 PM  Performed by: Ivan Fitzgerald M.D.  Authorized by: Ivan Fitzgerald M.D.     Location:  OR  Urgency:  Elective  Indications for Airway Management:  Anesthesia      Spontaneous Ventilation: absent    Sedation Level:  Deep  Preoxygenated: Yes    Mask Difficulty Assessment:  1 - vent by mask  Final Airway Type:  Supraglottic airway  Final Supraglottic Airway:  Standard LMA    SGA Size:  4  Number of Attempts at Approach:  1

## 2021-09-03 DIAGNOSIS — I10 ESSENTIAL HYPERTENSION: ICD-10-CM

## 2021-09-03 RX ORDER — LOSARTAN POTASSIUM 25 MG/1
TABLET ORAL
Qty: 90 TABLET | Refills: 1 | Status: SHIPPED | OUTPATIENT
Start: 2021-09-03 | End: 2022-01-06

## 2021-09-03 NOTE — TELEPHONE ENCOUNTER
Received request via: Pharmacy    Was the patient seen in the last year in this department? Yes    Does the patient have an active prescription (recently filled or refills available) for medication(s) requested? No    Requested Prescriptions     Pending Prescriptions Disp Refills    losartan (COZAAR) 25 MG Tab [Pharmacy Med Name: Losartan Potassium 25 MG Oral Tablet] 90 Tablet 1     Sig: Take 1 tablet by mouth once daily

## 2022-01-06 ENCOUNTER — OFFICE VISIT (OUTPATIENT)
Dept: MEDICAL GROUP | Facility: PHYSICIAN GROUP | Age: 67
End: 2022-01-06
Payer: COMMERCIAL

## 2022-01-06 VITALS
TEMPERATURE: 98.6 F | OXYGEN SATURATION: 94 % | WEIGHT: 265 LBS | HEIGHT: 64 IN | RESPIRATION RATE: 16 BRPM | SYSTOLIC BLOOD PRESSURE: 142 MMHG | DIASTOLIC BLOOD PRESSURE: 82 MMHG | HEART RATE: 94 BPM | BODY MASS INDEX: 45.24 KG/M2

## 2022-01-06 DIAGNOSIS — I10 ESSENTIAL HYPERTENSION: ICD-10-CM

## 2022-01-06 DIAGNOSIS — Z12.31 VISIT FOR SCREENING MAMMOGRAM: ICD-10-CM

## 2022-01-06 DIAGNOSIS — F41.9 ANXIETY: ICD-10-CM

## 2022-01-06 DIAGNOSIS — Z78.0 MENOPAUSE: ICD-10-CM

## 2022-01-06 PROCEDURE — 99214 OFFICE O/P EST MOD 30 MIN: CPT | Performed by: NURSE PRACTITIONER

## 2022-01-06 RX ORDER — CIPROFLOXACIN HYDROCHLORIDE 3.5 MG/ML
SOLUTION/ DROPS TOPICAL
COMMUNITY
Start: 2021-11-20 | End: 2022-01-06

## 2022-01-06 RX ORDER — LOSARTAN POTASSIUM 50 MG/1
50 TABLET ORAL DAILY
Qty: 90 TABLET | Refills: 1 | Status: SHIPPED | OUTPATIENT
Start: 2022-01-06 | End: 2022-08-23 | Stop reason: SDUPTHER

## 2022-01-06 RX ORDER — PREDNISOLONE ACETATE 10 MG/ML
SUSPENSION/ DROPS OPHTHALMIC
COMMUNITY
Start: 2021-11-23 | End: 2022-01-06

## 2022-01-06 RX ORDER — ALPRAZOLAM 0.25 MG/1
0.25 TABLET ORAL 3 TIMES DAILY PRN
Qty: 30 TABLET | Refills: 1 | Status: SHIPPED | OUTPATIENT
Start: 2022-01-06 | End: 2022-07-05

## 2022-01-06 ASSESSMENT — PATIENT HEALTH QUESTIONNAIRE - PHQ9
CLINICAL INTERPRETATION OF PHQ2 SCORE: 2
SUM OF ALL RESPONSES TO PHQ QUESTIONS 1-9: 7
5. POOR APPETITE OR OVEREATING: 1 - SEVERAL DAYS

## 2022-01-06 ASSESSMENT — FIBROSIS 4 INDEX: FIB4 SCORE: 1.03

## 2022-01-06 NOTE — PROGRESS NOTES
"Chief Complaint   Patient presents with   • Medication Refill       HISTORY OF PRESENT ILLNESS: Patient is a 66 y.o. female established patient who presents today for med refill    Anxiety  Is a chronic condition, stable  Not currently on SSRI but does take benzodiazepine in the form of alprazolam 0.25 mg 1 pill up to 3 times a day as needed, she uses this very sparingly in fact has not been refilled since 2020  She does have a lot of stressors going on in her life especially with the health of her daughter, has upcoming surgery and anticipates increased stress  Requesting refill, this is reasonable  Patient understands the risk associated with benzodiazepine use [place]  reviewed, taking medications appropriately with no evidence of concerning behavior      Essential hypertension  Is a chronic condition, stable and suboptimally controlled  Blood pressure elevated today at 160/84 however she reports that she has had a lot of stress especially driving into her appointment, drives from [place]  Currently taking losartan 25 mg, she does need refills  However we will increase dose to 50 mg  Due for routine fasting labs, these have been ordered  Of note at end of visit blood pressure was 142/82        ROS: per HPI    Exam:  /82   Pulse 94   Temp 37 °C (98.6 °F) (Temporal)   Resp 16   Ht 1.626 m (5' 4\")   Wt 120 kg (265 lb)   SpO2 94%   General:  Well nourished, well developed female in NAD  Skin: warm, dry, intact, no evidence of rash or concerning lesions  Head: is grossly normal.  HEENT: eyes clear, conjunctiva normal, PERRLA  Pulmonary: Clear to ausculation. Normal effort. No rales, ronchi, or wheezing.  Cardiovascular: Regular rate and rhythm without murmur. Carotid and radial pulses are intact and equal bilaterally.  Abdomen: soft, non-tender, positive bowel sounds  Musculoskeletal: no clubbing, cyanosis, or edema.  Psych/mental: no depression, anxiety, hallucinations  Neuro: alert, intact, CN 2-12 " grossly intact      Medical decision-making and discussion:        Assessment/Plan:      1. Essential hypertension  -take meds as prescribed and call for refills when needed  -monitor BP at home if warranted  -have labs done when due/ordered  -continue with healthy lifestyle modification  -will increase losartan to 50 mg daily    - losartan (COZAAR) 50 MG Tab; Take 1 Tablet by mouth every day.  Dispense: 90 Tablet; Refill: 1  - CBC WITH DIFFERENTIAL; Future  - Comp Metabolic Panel; Future  - Lipid Profile; Future    2. Anxiety  meds refilled as below  Advised to take only as needed and sparingly  Follow up if finding that more frequent dosing is needed  - ALPRAZolam (XANAX) 0.25 MG Tab; Take 1 Tablet by mouth 3 times a day as needed for Sleep for up to 180 days. 1-2 tabs PRN  Dispense: 30 Tablet; Refill: 1    3. Visit for screening mammogram    - MA-SCREENING MAMMO BILAT W/TOMOSYNTHESIS W/CAD; Future    4. Menopause    - DS-BONE DENSITY STUDY (DEXA); Future         Return for pending results.        Please note that this dictation was created using voice recognition software. I have made every reasonable attempt to correct obvious errors, but I expect that there are errors of grammar and possibly content that I did not discover before finalizing the note.

## 2022-01-06 NOTE — ASSESSMENT & PLAN NOTE
Is a chronic condition, stable and suboptimally controlled  Blood pressure elevated today at 160/84 however she reports that she has had a lot of stress especially driving into her appointment, drives from [place]  Currently taking losartan 25 mg, she does need refills  However we will increase dose to 50 mg  Due for routine fasting labs, these have been ordered  Of note at end of visit blood pressure was 142/82

## 2022-01-06 NOTE — ASSESSMENT & PLAN NOTE
Is a chronic condition, stable  Not currently on SSRI but does take benzodiazepine in the form of alprazolam 0.25 mg 1 pill up to 3 times a day as needed, she uses this very sparingly in fact has not been refilled since 2020  She does have a lot of stressors going on in her life especially with the health of her daughter, has upcoming surgery and anticipates increased stress  Requesting refill, this is reasonable  Patient understands the risk associated with benzodiazepine use Yamilet LENNON reviewed, taking medications appropriately with no evidence of concerning behavior

## 2022-01-14 ENCOUNTER — HOSPITAL ENCOUNTER (OUTPATIENT)
Dept: LAB | Facility: MEDICAL CENTER | Age: 67
End: 2022-01-14
Attending: NURSE PRACTITIONER
Payer: COMMERCIAL

## 2022-01-14 DIAGNOSIS — I10 ESSENTIAL HYPERTENSION: ICD-10-CM

## 2022-01-14 LAB
ALBUMIN SERPL BCP-MCNC: 3.9 G/DL (ref 3.2–4.9)
ALBUMIN/GLOB SERPL: 1.6 G/DL
ALP SERPL-CCNC: 68 U/L (ref 30–99)
ALT SERPL-CCNC: 16 U/L (ref 2–50)
ANION GAP SERPL CALC-SCNC: 10 MMOL/L (ref 7–16)
AST SERPL-CCNC: 19 U/L (ref 12–45)
BASOPHILS # BLD AUTO: 0.9 % (ref 0–1.8)
BASOPHILS # BLD: 0.05 K/UL (ref 0–0.12)
BILIRUB SERPL-MCNC: 0.6 MG/DL (ref 0.1–1.5)
BUN SERPL-MCNC: 12 MG/DL (ref 8–22)
CALCIUM SERPL-MCNC: 9.2 MG/DL (ref 8.5–10.5)
CHLORIDE SERPL-SCNC: 108 MMOL/L (ref 96–112)
CHOLEST SERPL-MCNC: 191 MG/DL (ref 100–199)
CO2 SERPL-SCNC: 24 MMOL/L (ref 20–33)
CREAT SERPL-MCNC: 0.58 MG/DL (ref 0.5–1.4)
EOSINOPHIL # BLD AUTO: 0.11 K/UL (ref 0–0.51)
EOSINOPHIL NFR BLD: 2 % (ref 0–6.9)
ERYTHROCYTE [DISTWIDTH] IN BLOOD BY AUTOMATED COUNT: 46.7 FL (ref 35.9–50)
FASTING STATUS PATIENT QL REPORTED: NORMAL
GLOBULIN SER CALC-MCNC: 2.5 G/DL (ref 1.9–3.5)
GLUCOSE SERPL-MCNC: 90 MG/DL (ref 65–99)
HCT VFR BLD AUTO: 40.2 % (ref 37–47)
HDLC SERPL-MCNC: 57 MG/DL
HGB BLD-MCNC: 13 G/DL (ref 12–16)
IMM GRANULOCYTES # BLD AUTO: 0.01 K/UL (ref 0–0.11)
IMM GRANULOCYTES NFR BLD AUTO: 0.2 % (ref 0–0.9)
LDLC SERPL CALC-MCNC: 120 MG/DL
LYMPHOCYTES # BLD AUTO: 1.75 K/UL (ref 1–4.8)
LYMPHOCYTES NFR BLD: 31.5 % (ref 22–41)
MCH RBC QN AUTO: 31.3 PG (ref 27–33)
MCHC RBC AUTO-ENTMCNC: 32.3 G/DL (ref 33.6–35)
MCV RBC AUTO: 96.9 FL (ref 81.4–97.8)
MONOCYTES # BLD AUTO: 0.44 K/UL (ref 0–0.85)
MONOCYTES NFR BLD AUTO: 7.9 % (ref 0–13.4)
NEUTROPHILS # BLD AUTO: 3.2 K/UL (ref 2–7.15)
NEUTROPHILS NFR BLD: 57.5 % (ref 44–72)
NRBC # BLD AUTO: 0 K/UL
NRBC BLD-RTO: 0 /100 WBC
PLATELET # BLD AUTO: 337 K/UL (ref 164–446)
PMV BLD AUTO: 10 FL (ref 9–12.9)
POTASSIUM SERPL-SCNC: 3.9 MMOL/L (ref 3.6–5.5)
PROT SERPL-MCNC: 6.4 G/DL (ref 6–8.2)
RBC # BLD AUTO: 4.15 M/UL (ref 4.2–5.4)
SODIUM SERPL-SCNC: 142 MMOL/L (ref 135–145)
TRIGL SERPL-MCNC: 69 MG/DL (ref 0–149)
WBC # BLD AUTO: 5.6 K/UL (ref 4.8–10.8)

## 2022-01-14 PROCEDURE — 80061 LIPID PANEL: CPT

## 2022-01-14 PROCEDURE — 36415 COLL VENOUS BLD VENIPUNCTURE: CPT

## 2022-01-14 PROCEDURE — 85025 COMPLETE CBC W/AUTO DIFF WBC: CPT

## 2022-01-14 PROCEDURE — 80053 COMPREHEN METABOLIC PANEL: CPT

## 2022-08-23 DIAGNOSIS — I10 ESSENTIAL HYPERTENSION: ICD-10-CM

## 2022-08-23 RX ORDER — LOSARTAN POTASSIUM 50 MG/1
50 TABLET ORAL DAILY
Qty: 90 TABLET | Refills: 0 | Status: SHIPPED | OUTPATIENT
Start: 2022-08-23 | End: 2024-03-18

## 2022-08-23 NOTE — TELEPHONE ENCOUNTER
Received request via: Patient    Was the patient seen in the last year in this department? Yes    Does the patient have an active prescription (recently filled or refills available) for medication(s) requested? No    Last ov 1/6/22

## 2023-09-08 ENCOUNTER — HOSPITAL ENCOUNTER (OUTPATIENT)
Dept: LAB | Facility: MEDICAL CENTER | Age: 68
End: 2023-09-08
Attending: FAMILY MEDICINE
Payer: COMMERCIAL

## 2023-09-08 LAB
ALBUMIN SERPL BCP-MCNC: 4.1 G/DL (ref 3.2–4.9)
ALBUMIN/GLOB SERPL: 1.5 G/DL
ALP SERPL-CCNC: 80 U/L (ref 30–99)
ALT SERPL-CCNC: 14 U/L (ref 2–50)
ANION GAP SERPL CALC-SCNC: 9 MMOL/L (ref 7–16)
AST SERPL-CCNC: 21 U/L (ref 12–45)
BASOPHILS # BLD AUTO: 0.7 % (ref 0–1.8)
BASOPHILS # BLD: 0.04 K/UL (ref 0–0.12)
BILIRUB SERPL-MCNC: 0.6 MG/DL (ref 0.1–1.5)
BUN SERPL-MCNC: 13 MG/DL (ref 8–22)
CALCIUM ALBUM COR SERPL-MCNC: 9.3 MG/DL (ref 8.5–10.5)
CALCIUM SERPL-MCNC: 9.4 MG/DL (ref 8.5–10.5)
CHLORIDE SERPL-SCNC: 106 MMOL/L (ref 96–112)
CHOLEST SERPL-MCNC: 202 MG/DL (ref 100–199)
CO2 SERPL-SCNC: 26 MMOL/L (ref 20–33)
CREAT SERPL-MCNC: 0.59 MG/DL (ref 0.5–1.4)
EOSINOPHIL # BLD AUTO: 0.1 K/UL (ref 0–0.51)
EOSINOPHIL NFR BLD: 1.8 % (ref 0–6.9)
ERYTHROCYTE [DISTWIDTH] IN BLOOD BY AUTOMATED COUNT: 46.5 FL (ref 35.9–50)
EST. AVERAGE GLUCOSE BLD GHB EST-MCNC: 94 MG/DL
FASTING STATUS PATIENT QL REPORTED: NORMAL
GFR SERPLBLD CREATININE-BSD FMLA CKD-EPI: 98 ML/MIN/1.73 M 2
GLOBULIN SER CALC-MCNC: 2.8 G/DL (ref 1.9–3.5)
GLUCOSE SERPL-MCNC: 87 MG/DL (ref 65–99)
HBA1C MFR BLD: 4.9 % (ref 4–5.6)
HCT VFR BLD AUTO: 40.3 % (ref 37–47)
HDLC SERPL-MCNC: 58 MG/DL
HGB BLD-MCNC: 13.2 G/DL (ref 12–16)
IMM GRANULOCYTES # BLD AUTO: 0.03 K/UL (ref 0–0.11)
IMM GRANULOCYTES NFR BLD AUTO: 0.5 % (ref 0–0.9)
LDLC SERPL CALC-MCNC: 130 MG/DL
LYMPHOCYTES # BLD AUTO: 2.15 K/UL (ref 1–4.8)
LYMPHOCYTES NFR BLD: 37.9 % (ref 22–41)
MCH RBC QN AUTO: 32 PG (ref 27–33)
MCHC RBC AUTO-ENTMCNC: 32.8 G/DL (ref 32.2–35.5)
MCV RBC AUTO: 97.6 FL (ref 81.4–97.8)
MONOCYTES # BLD AUTO: 0.44 K/UL (ref 0–0.85)
MONOCYTES NFR BLD AUTO: 7.8 % (ref 0–13.4)
NEUTROPHILS # BLD AUTO: 2.91 K/UL (ref 1.82–7.42)
NEUTROPHILS NFR BLD: 51.3 % (ref 44–72)
NRBC # BLD AUTO: 0 K/UL
NRBC BLD-RTO: 0 /100 WBC (ref 0–0.2)
PLATELET # BLD AUTO: 322 K/UL (ref 164–446)
PMV BLD AUTO: 10 FL (ref 9–12.9)
POTASSIUM SERPL-SCNC: 4.9 MMOL/L (ref 3.6–5.5)
PROT SERPL-MCNC: 6.9 G/DL (ref 6–8.2)
RBC # BLD AUTO: 4.13 M/UL (ref 4.2–5.4)
SODIUM SERPL-SCNC: 141 MMOL/L (ref 135–145)
TRIGL SERPL-MCNC: 70 MG/DL (ref 0–149)
TSH SERPL DL<=0.005 MIU/L-ACNC: 1.9 UIU/ML (ref 0.38–5.33)
WBC # BLD AUTO: 5.7 K/UL (ref 4.8–10.8)

## 2023-09-08 PROCEDURE — 85025 COMPLETE CBC W/AUTO DIFF WBC: CPT

## 2023-09-08 PROCEDURE — 36415 COLL VENOUS BLD VENIPUNCTURE: CPT

## 2023-09-08 PROCEDURE — 84443 ASSAY THYROID STIM HORMONE: CPT

## 2023-09-08 PROCEDURE — 83036 HEMOGLOBIN GLYCOSYLATED A1C: CPT

## 2023-09-08 PROCEDURE — 80061 LIPID PANEL: CPT

## 2023-09-08 PROCEDURE — 80053 COMPREHEN METABOLIC PANEL: CPT

## 2023-12-13 ENCOUNTER — HOSPITAL ENCOUNTER (OUTPATIENT)
Facility: MEDICAL CENTER | Age: 68
End: 2023-12-13
Attending: INTERNAL MEDICINE
Payer: COMMERCIAL

## 2023-12-13 PROCEDURE — 86300 IMMUNOASSAY TUMOR CA 15-3: CPT

## 2023-12-15 LAB — CANCER AG27-29 SERPL-ACNC: 41.4 U/ML

## 2023-12-20 ENCOUNTER — HOSPITAL ENCOUNTER (OUTPATIENT)
Dept: RADIOLOGY | Facility: MEDICAL CENTER | Age: 68
End: 2023-12-20
Attending: INTERNAL MEDICINE
Payer: COMMERCIAL

## 2023-12-20 DIAGNOSIS — C50.812 MALIGNANT NEOPLASM OF OVERLAPPING SITES OF LEFT FEMALE BREAST, UNSPECIFIED ESTROGEN RECEPTOR STATUS (HCC): ICD-10-CM

## 2023-12-20 PROCEDURE — 71260 CT THORAX DX C+: CPT

## 2023-12-20 PROCEDURE — A9503 TC99M MEDRONATE: HCPCS

## 2023-12-20 PROCEDURE — 700117 HCHG RX CONTRAST REV CODE 255: Performed by: INTERNAL MEDICINE

## 2023-12-20 RX ADMIN — IOHEXOL 100 ML: 350 INJECTION, SOLUTION INTRAVENOUS at 11:55

## 2023-12-20 RX ADMIN — IOHEXOL 25 ML: 240 INJECTION, SOLUTION INTRATHECAL; INTRAVASCULAR; INTRAVENOUS; ORAL at 11:59

## 2023-12-28 ENCOUNTER — HOSPITAL ENCOUNTER (OUTPATIENT)
Dept: RADIOLOGY | Facility: MEDICAL CENTER | Age: 68
End: 2023-12-28
Attending: INTERNAL MEDICINE
Payer: COMMERCIAL

## 2023-12-28 DIAGNOSIS — C50.812 MALIGNANT NEOPLASM OF OVERLAPPING SITES OF LEFT FEMALE BREAST, UNSPECIFIED ESTROGEN RECEPTOR STATUS (HCC): ICD-10-CM

## 2023-12-28 PROCEDURE — 72170 X-RAY EXAM OF PELVIS: CPT

## 2023-12-28 PROCEDURE — C8908 MRI W/O FOL W/CONT, BREAST,: HCPCS

## 2023-12-28 PROCEDURE — A9579 GAD-BASE MR CONTRAST NOS,1ML: HCPCS | Performed by: INTERNAL MEDICINE

## 2023-12-28 PROCEDURE — 700117 HCHG RX CONTRAST REV CODE 255: Performed by: INTERNAL MEDICINE

## 2023-12-28 PROCEDURE — 73552 X-RAY EXAM OF FEMUR 2/>: CPT | Mod: RT

## 2023-12-28 RX ADMIN — GADOTERIDOL 20 ML: 279.3 INJECTION, SOLUTION INTRAVENOUS at 16:53

## 2023-12-29 ENCOUNTER — HOSPITAL ENCOUNTER (OUTPATIENT)
Dept: RADIOLOGY | Facility: MEDICAL CENTER | Age: 68
End: 2023-12-29
Payer: COMMERCIAL

## 2024-01-04 ENCOUNTER — HOSPITAL ENCOUNTER (OUTPATIENT)
Dept: CARDIOLOGY | Facility: MEDICAL CENTER | Age: 69
End: 2024-01-04
Attending: INTERNAL MEDICINE
Payer: COMMERCIAL

## 2024-01-04 DIAGNOSIS — C50.812 MALIGNANT NEOPLASM OF OVERLAPPING SITES OF LEFT FEMALE BREAST, UNSPECIFIED ESTROGEN RECEPTOR STATUS (HCC): ICD-10-CM

## 2024-01-04 PROCEDURE — 93306 TTE W/DOPPLER COMPLETE: CPT

## 2024-01-05 LAB
LV EJECT FRACT  99904: 55
LV EJECT FRACT MOD 2C 99903: 49.28
LV EJECT FRACT MOD 4C 99902: 61.64
LV EJECT FRACT MOD BP 99901: 55.22

## 2024-01-05 PROCEDURE — 93306 TTE W/DOPPLER COMPLETE: CPT | Mod: 26 | Performed by: INTERNAL MEDICINE

## 2024-01-10 ENCOUNTER — TELEPHONE (OUTPATIENT)
Dept: CARDIOLOGY | Facility: MEDICAL CENTER | Age: 69
End: 2024-01-10
Payer: COMMERCIAL

## 2024-01-10 NOTE — TELEPHONE ENCOUNTER
Our Structural Heart Program has implemented a quality initiative aimed at identifying patients with valvular heart disease and confirming a clinical plan for their care upon diagnosis.     Your patient has been flagged through our echocardiogram surveillance with the following echocardiogram results:    Severe Mitral Regurgitation    Cardiologist: none/unknown    Current Plan of Care for Structural Heart Disease: none/unknown    Next Echo date: none/unknown    Next Follow up appointment: none/unknown    Please place Referral to [facility], sub-specialty Structural Heart Program, if warranted for consult and treatment.

## 2024-01-12 ENCOUNTER — HOSPITAL ENCOUNTER (OUTPATIENT)
Facility: MEDICAL CENTER | Age: 69
End: 2024-01-12
Payer: COMMERCIAL

## 2024-01-12 LAB
ALBUMIN SERPL BCP-MCNC: 4.1 G/DL (ref 3.2–4.9)
ALBUMIN/GLOB SERPL: 1.7 G/DL
ALP SERPL-CCNC: 88 U/L (ref 30–99)
ALT SERPL-CCNC: 14 U/L (ref 2–50)
ANION GAP SERPL CALC-SCNC: 11 MMOL/L (ref 7–16)
AST SERPL-CCNC: 17 U/L (ref 12–45)
BILIRUB SERPL-MCNC: 0.4 MG/DL (ref 0.1–1.5)
BUN SERPL-MCNC: 12 MG/DL (ref 8–22)
CALCIUM ALBUM COR SERPL-MCNC: 9.3 MG/DL (ref 8.5–10.5)
CALCIUM SERPL-MCNC: 9.4 MG/DL (ref 8.5–10.5)
CHLORIDE SERPL-SCNC: 107 MMOL/L (ref 96–112)
CO2 SERPL-SCNC: 26 MMOL/L (ref 20–33)
CREAT SERPL-MCNC: 0.45 MG/DL (ref 0.5–1.4)
GFR SERPLBLD CREATININE-BSD FMLA CKD-EPI: 104 ML/MIN/1.73 M 2
GLOBULIN SER CALC-MCNC: 2.4 G/DL (ref 1.9–3.5)
GLUCOSE SERPL-MCNC: 70 MG/DL (ref 65–99)
HBV CORE IGM SER QL: NORMAL
POTASSIUM SERPL-SCNC: 4.6 MMOL/L (ref 3.6–5.5)
PROT SERPL-MCNC: 6.5 G/DL (ref 6–8.2)
SODIUM SERPL-SCNC: 144 MMOL/L (ref 135–145)

## 2024-01-12 PROCEDURE — 80053 COMPREHEN METABOLIC PANEL: CPT

## 2024-01-12 PROCEDURE — 86705 HEP B CORE ANTIBODY IGM: CPT

## 2024-01-15 ENCOUNTER — HOSPITAL ENCOUNTER (OUTPATIENT)
Dept: RADIOLOGY | Facility: MEDICAL CENTER | Age: 69
End: 2024-01-15

## 2024-01-15 ENCOUNTER — HOSPITAL ENCOUNTER (OUTPATIENT)
Dept: RADIOLOGY | Facility: MEDICAL CENTER | Age: 69
End: 2024-01-15
Attending: INTERNAL MEDICINE
Payer: COMMERCIAL

## 2024-01-15 DIAGNOSIS — C50.812 MALIGNANT NEOPLASM OF OVERLAPPING SITES OF LEFT FEMALE BREAST, UNSPECIFIED ESTROGEN RECEPTOR STATUS (HCC): ICD-10-CM

## 2024-01-15 LAB — PATHOLOGY CONSULT NOTE: NORMAL

## 2024-01-15 PROCEDURE — 88342 IMHCHEM/IMCYTCHM 1ST ANTB: CPT

## 2024-01-15 PROCEDURE — 88305 TISSUE EXAM BY PATHOLOGIST: CPT

## 2024-01-15 PROCEDURE — 88360 TUMOR IMMUNOHISTOCHEM/MANUAL: CPT | Mod: 91

## 2024-01-15 PROCEDURE — A4648 IMPLANTABLE TISSUE MARKER: HCPCS

## 2024-01-15 PROCEDURE — 19083 BX BREAST 1ST LESION US IMAG: CPT | Mod: LT

## 2024-01-15 PROCEDURE — 306637 US-ULTRASOUND BIOPSY LEFT INITIAL LESION

## 2024-01-16 ENCOUNTER — TELEPHONE (OUTPATIENT)
Dept: RADIOLOGY | Facility: MEDICAL CENTER | Age: 69
End: 2024-01-16
Payer: COMMERCIAL

## 2024-01-17 ENCOUNTER — TELEPHONE (OUTPATIENT)
Dept: RADIOLOGY | Facility: MEDICAL CENTER | Age: 69
End: 2024-01-17
Payer: COMMERCIAL

## 2024-01-19 ENCOUNTER — HOSPITAL ENCOUNTER (OUTPATIENT)
Dept: RADIOLOGY | Facility: MEDICAL CENTER | Age: 69
End: 2024-01-19
Payer: COMMERCIAL

## 2024-01-29 ENCOUNTER — HOSPITAL ENCOUNTER (OUTPATIENT)
Dept: LAB | Facility: MEDICAL CENTER | Age: 69
End: 2024-01-29
Attending: INTERNAL MEDICINE
Payer: COMMERCIAL

## 2024-01-29 LAB
ALBUMIN SERPL BCP-MCNC: 3.3 G/DL (ref 3.2–4.9)
ALBUMIN/GLOB SERPL: 0.8 G/DL
ALP SERPL-CCNC: 89 U/L (ref 30–99)
ALT SERPL-CCNC: 11 U/L (ref 2–50)
ANION GAP SERPL CALC-SCNC: 14 MMOL/L (ref 7–16)
AST SERPL-CCNC: 19 U/L (ref 12–45)
BASOPHILS # BLD AUTO: 1 % (ref 0–1.8)
BASOPHILS # BLD: 0.09 K/UL (ref 0–0.12)
BILIRUB SERPL-MCNC: 0.3 MG/DL (ref 0.1–1.5)
BUN SERPL-MCNC: 12 MG/DL (ref 8–22)
CALCIUM ALBUM COR SERPL-MCNC: 10.2 MG/DL (ref 8.5–10.5)
CALCIUM SERPL-MCNC: 9.6 MG/DL (ref 8.5–10.5)
CHLORIDE SERPL-SCNC: 104 MMOL/L (ref 96–112)
CO2 SERPL-SCNC: 23 MMOL/L (ref 20–33)
CREAT SERPL-MCNC: 0.54 MG/DL (ref 0.5–1.4)
EOSINOPHIL # BLD AUTO: 0.11 K/UL (ref 0–0.51)
EOSINOPHIL NFR BLD: 1.2 % (ref 0–6.9)
ERYTHROCYTE [DISTWIDTH] IN BLOOD BY AUTOMATED COUNT: 45.2 FL (ref 35.9–50)
GFR SERPLBLD CREATININE-BSD FMLA CKD-EPI: 100 ML/MIN/1.73 M 2
GLOBULIN SER CALC-MCNC: 4.2 G/DL (ref 1.9–3.5)
GLUCOSE SERPL-MCNC: 84 MG/DL (ref 65–99)
HCT VFR BLD AUTO: 40.2 % (ref 37–47)
HGB BLD-MCNC: 13.7 G/DL (ref 12–16)
IMM GRANULOCYTES # BLD AUTO: 0.02 K/UL (ref 0–0.11)
IMM GRANULOCYTES NFR BLD AUTO: 0.2 % (ref 0–0.9)
LYMPHOCYTES # BLD AUTO: 3.2 K/UL (ref 1–4.8)
LYMPHOCYTES NFR BLD: 34.8 % (ref 22–41)
MCH RBC QN AUTO: 32.6 PG (ref 27–33)
MCHC RBC AUTO-ENTMCNC: 34.1 G/DL (ref 32.2–35.5)
MCV RBC AUTO: 95.7 FL (ref 81.4–97.8)
MONOCYTES # BLD AUTO: 0.67 K/UL (ref 0–0.85)
MONOCYTES NFR BLD AUTO: 7.3 % (ref 0–13.4)
NEUTROPHILS # BLD AUTO: 5.11 K/UL (ref 1.82–7.42)
NEUTROPHILS NFR BLD: 55.5 % (ref 44–72)
NRBC # BLD AUTO: 0 K/UL
NRBC BLD-RTO: 0 /100 WBC (ref 0–0.2)
PLATELET # BLD AUTO: 357 K/UL (ref 164–446)
PMV BLD AUTO: 10.1 FL (ref 9–12.9)
POTASSIUM SERPL-SCNC: 4.3 MMOL/L (ref 3.6–5.5)
PROT SERPL-MCNC: 7.5 G/DL (ref 6–8.2)
RBC # BLD AUTO: 4.2 M/UL (ref 4.2–5.4)
SODIUM SERPL-SCNC: 141 MMOL/L (ref 135–145)
WBC # BLD AUTO: 9.2 K/UL (ref 4.8–10.8)

## 2024-01-29 PROCEDURE — 80053 COMPREHEN METABOLIC PANEL: CPT

## 2024-01-29 PROCEDURE — 36415 COLL VENOUS BLD VENIPUNCTURE: CPT

## 2024-01-29 PROCEDURE — 85025 COMPLETE CBC W/AUTO DIFF WBC: CPT

## 2024-02-09 ENCOUNTER — HOSPITAL ENCOUNTER (OUTPATIENT)
Facility: MEDICAL CENTER | Age: 69
End: 2024-02-09
Payer: COMMERCIAL

## 2024-02-09 LAB
ALBUMIN SERPL BCP-MCNC: 4.1 G/DL (ref 3.2–4.9)
ALBUMIN/GLOB SERPL: 1.6 G/DL
ALP SERPL-CCNC: 98 U/L (ref 30–99)
ALT SERPL-CCNC: 17 U/L (ref 2–50)
ANION GAP SERPL CALC-SCNC: 11 MMOL/L (ref 7–16)
AST SERPL-CCNC: 13 U/L (ref 12–45)
BASOPHILS # BLD AUTO: 2.6 % (ref 0–1.8)
BASOPHILS # BLD: 0.07 K/UL (ref 0–0.12)
BILIRUB SERPL-MCNC: 0.3 MG/DL (ref 0.1–1.5)
BUN SERPL-MCNC: 10 MG/DL (ref 8–22)
BURR CELLS BLD QL SMEAR: NORMAL
CALCIUM ALBUM COR SERPL-MCNC: 9.7 MG/DL (ref 8.5–10.5)
CALCIUM SERPL-MCNC: 9.8 MG/DL (ref 8.5–10.5)
CHLORIDE SERPL-SCNC: 103 MMOL/L (ref 96–112)
CO2 SERPL-SCNC: 23 MMOL/L (ref 20–33)
CREAT SERPL-MCNC: 0.5 MG/DL (ref 0.5–1.4)
EOSINOPHIL # BLD AUTO: 0.24 K/UL (ref 0–0.51)
EOSINOPHIL NFR BLD: 9.6 % (ref 0–6.9)
ERYTHROCYTE [DISTWIDTH] IN BLOOD BY AUTOMATED COUNT: 44.3 FL (ref 35.9–50)
GFR SERPLBLD CREATININE-BSD FMLA CKD-EPI: 102 ML/MIN/1.73 M 2
GLOBULIN SER CALC-MCNC: 2.6 G/DL (ref 1.9–3.5)
GLUCOSE SERPL-MCNC: 87 MG/DL (ref 65–99)
HCT VFR BLD AUTO: 36.1 % (ref 37–47)
HGB BLD-MCNC: 12 G/DL (ref 12–16)
LYMPHOCYTES # BLD AUTO: 1.78 K/UL (ref 1–4.8)
LYMPHOCYTES NFR BLD: 71.3 % (ref 22–41)
MANUAL DIFF BLD: NORMAL
MCH RBC QN AUTO: 32.3 PG (ref 27–33)
MCHC RBC AUTO-ENTMCNC: 33.2 G/DL (ref 32.2–35.5)
MCV RBC AUTO: 97.3 FL (ref 81.4–97.8)
MONOCYTES # BLD AUTO: 0.22 K/UL (ref 0–0.85)
MONOCYTES NFR BLD AUTO: 8.7 % (ref 0–13.4)
MORPHOLOGY BLD-IMP: NORMAL
NEUTROPHILS # BLD AUTO: 0.2 K/UL (ref 1.82–7.42)
NEUTROPHILS NFR BLD: 7.8 % (ref 44–72)
NRBC # BLD AUTO: 0 K/UL
NRBC BLD-RTO: 0 /100 WBC (ref 0–0.2)
PLATELET # BLD AUTO: 180 K/UL (ref 164–446)
PLATELET BLD QL SMEAR: NORMAL
PMV BLD AUTO: 10.6 FL (ref 9–12.9)
POIKILOCYTOSIS BLD QL SMEAR: NORMAL
POTASSIUM SERPL-SCNC: 4.5 MMOL/L (ref 3.6–5.5)
PROT SERPL-MCNC: 6.7 G/DL (ref 6–8.2)
RBC # BLD AUTO: 3.71 M/UL (ref 4.2–5.4)
RBC BLD AUTO: PRESENT
SODIUM SERPL-SCNC: 137 MMOL/L (ref 135–145)
WBC # BLD AUTO: 2.5 K/UL (ref 4.8–10.8)

## 2024-02-09 PROCEDURE — 85007 BL SMEAR W/DIFF WBC COUNT: CPT

## 2024-02-09 PROCEDURE — 80053 COMPREHEN METABOLIC PANEL: CPT

## 2024-02-09 PROCEDURE — 85027 COMPLETE CBC AUTOMATED: CPT

## 2024-02-12 ENCOUNTER — HOSPITAL ENCOUNTER (OUTPATIENT)
Dept: LAB | Facility: MEDICAL CENTER | Age: 69
End: 2024-02-12
Attending: INTERNAL MEDICINE
Payer: COMMERCIAL

## 2024-02-12 LAB
ALBUMIN SERPL BCP-MCNC: 4 G/DL (ref 3.2–4.9)
ALBUMIN/GLOB SERPL: 1.2 G/DL
ALP SERPL-CCNC: 103 U/L (ref 30–99)
ALT SERPL-CCNC: 12 U/L (ref 2–50)
ANION GAP SERPL CALC-SCNC: 13 MMOL/L (ref 7–16)
ANISOCYTOSIS BLD QL SMEAR: ABNORMAL
AST SERPL-CCNC: 20 U/L (ref 12–45)
BASOPHILS # BLD AUTO: 0 % (ref 0–1.8)
BASOPHILS # BLD: 0 K/UL (ref 0–0.12)
BILIRUB SERPL-MCNC: <0.2 MG/DL (ref 0.1–1.5)
BLASTS NFR BLD MANUAL: 0.9 %
BUN SERPL-MCNC: 7 MG/DL (ref 8–22)
CALCIUM ALBUM COR SERPL-MCNC: 9.8 MG/DL (ref 8.5–10.5)
CALCIUM SERPL-MCNC: 9.8 MG/DL (ref 8.5–10.5)
CHLORIDE SERPL-SCNC: 105 MMOL/L (ref 96–112)
CO2 SERPL-SCNC: 22 MMOL/L (ref 20–33)
CREAT SERPL-MCNC: 0.53 MG/DL (ref 0.5–1.4)
EOSINOPHIL # BLD AUTO: 0.21 K/UL (ref 0–0.51)
EOSINOPHIL NFR BLD: 1.7 % (ref 0–6.9)
ERYTHROCYTE [DISTWIDTH] IN BLOOD BY AUTOMATED COUNT: 43.8 FL (ref 35.9–50)
GFR SERPLBLD CREATININE-BSD FMLA CKD-EPI: 100 ML/MIN/1.73 M 2
GLOBULIN SER CALC-MCNC: 3.3 G/DL (ref 1.9–3.5)
GLUCOSE SERPL-MCNC: 115 MG/DL (ref 65–99)
HCT VFR BLD AUTO: 35.9 % (ref 37–47)
HGB BLD-MCNC: 12 G/DL (ref 12–16)
LYMPHOCYTES # BLD AUTO: 3.03 K/UL (ref 1–4.8)
LYMPHOCYTES NFR BLD: 25 % (ref 22–41)
MACROCYTES BLD QL SMEAR: ABNORMAL
MANUAL DIFF BLD: ABNORMAL
MCH RBC QN AUTO: 32.3 PG (ref 27–33)
MCHC RBC AUTO-ENTMCNC: 33.4 G/DL (ref 32.2–35.5)
MCV RBC AUTO: 96.5 FL (ref 81.4–97.8)
METAMYELOCYTES NFR BLD MANUAL: 0.9 %
MONOCYTES # BLD AUTO: 1.04 K/UL (ref 0–0.85)
MONOCYTES NFR BLD AUTO: 8.6 % (ref 0–13.4)
MORPHOLOGY BLD-IMP: NORMAL
MYELOCYTES NFR BLD MANUAL: 2.6 %
NEUTROPHILS # BLD AUTO: 7.3 K/UL (ref 1.82–7.42)
NEUTROPHILS NFR BLD: 59.5 % (ref 44–72)
NEUTS BAND NFR BLD MANUAL: 0.8 % (ref 0–10)
NRBC # BLD AUTO: 0.07 K/UL
NRBC BLD-RTO: 0.6 /100 WBC (ref 0–0.2)
OVALOCYTES BLD QL SMEAR: NORMAL
PLATELET # BLD AUTO: 243 K/UL (ref 164–446)
PLATELET BLD QL SMEAR: NORMAL
PMV BLD AUTO: 10.8 FL (ref 9–12.9)
POIKILOCYTOSIS BLD QL SMEAR: NORMAL
POTASSIUM SERPL-SCNC: 3.9 MMOL/L (ref 3.6–5.5)
PROT SERPL-MCNC: 7.3 G/DL (ref 6–8.2)
RBC # BLD AUTO: 3.72 M/UL (ref 4.2–5.4)
RBC BLD AUTO: PRESENT
SODIUM SERPL-SCNC: 140 MMOL/L (ref 135–145)
TOXIC GRANULES BLD QL SMEAR: NORMAL
WBC # BLD AUTO: 12.1 K/UL (ref 4.8–10.8)

## 2024-02-12 PROCEDURE — 85007 BL SMEAR W/DIFF WBC COUNT: CPT

## 2024-02-12 PROCEDURE — 80053 COMPREHEN METABOLIC PANEL: CPT

## 2024-02-12 PROCEDURE — 85027 COMPLETE CBC AUTOMATED: CPT

## 2024-02-12 PROCEDURE — 36415 COLL VENOUS BLD VENIPUNCTURE: CPT

## 2024-02-26 ENCOUNTER — HOSPITAL ENCOUNTER (OUTPATIENT)
Dept: LAB | Facility: MEDICAL CENTER | Age: 69
End: 2024-02-26
Attending: INTERNAL MEDICINE
Payer: COMMERCIAL

## 2024-02-26 LAB
ALBUMIN SERPL BCP-MCNC: 3.6 G/DL (ref 3.2–4.9)
ALBUMIN/GLOB SERPL: 1.1 G/DL
ALP SERPL-CCNC: 114 U/L (ref 30–99)
ALT SERPL-CCNC: 12 U/L (ref 2–50)
ANION GAP SERPL CALC-SCNC: 13 MMOL/L (ref 7–16)
ANISOCYTOSIS BLD QL SMEAR: ABNORMAL
AST SERPL-CCNC: 19 U/L (ref 12–45)
BASOPHILS # BLD AUTO: 0 % (ref 0–1.8)
BASOPHILS # BLD: 0 K/UL (ref 0–0.12)
BILIRUB SERPL-MCNC: <0.2 MG/DL (ref 0.1–1.5)
BUN SERPL-MCNC: 7 MG/DL (ref 8–22)
CALCIUM ALBUM COR SERPL-MCNC: 9.5 MG/DL (ref 8.5–10.5)
CALCIUM SERPL-MCNC: 9.2 MG/DL (ref 8.5–10.5)
CHLORIDE SERPL-SCNC: 105 MMOL/L (ref 96–112)
CO2 SERPL-SCNC: 22 MMOL/L (ref 20–33)
CREAT SERPL-MCNC: 0.53 MG/DL (ref 0.5–1.4)
EOSINOPHIL # BLD AUTO: 0.11 K/UL (ref 0–0.51)
EOSINOPHIL NFR BLD: 0.8 % (ref 0–6.9)
ERYTHROCYTE [DISTWIDTH] IN BLOOD BY AUTOMATED COUNT: 44.6 FL (ref 35.9–50)
GFR SERPLBLD CREATININE-BSD FMLA CKD-EPI: 100 ML/MIN/1.73 M 2
GLOBULIN SER CALC-MCNC: 3.2 G/DL (ref 1.9–3.5)
GLUCOSE SERPL-MCNC: 113 MG/DL (ref 65–99)
HCT VFR BLD AUTO: 33.2 % (ref 37–47)
HGB BLD-MCNC: 11.1 G/DL (ref 12–16)
LYMPHOCYTES # BLD AUTO: 3.27 K/UL (ref 1–4.8)
LYMPHOCYTES NFR BLD: 24.8 % (ref 22–41)
MANUAL DIFF BLD: NORMAL
MCH RBC QN AUTO: 32.4 PG (ref 27–33)
MCHC RBC AUTO-ENTMCNC: 33.4 G/DL (ref 32.2–35.5)
MCV RBC AUTO: 96.8 FL (ref 81.4–97.8)
METAMYELOCYTES NFR BLD MANUAL: 0.9 %
MICROCYTES BLD QL SMEAR: ABNORMAL
MONOCYTES # BLD AUTO: 1.12 K/UL (ref 0–0.85)
MONOCYTES NFR BLD AUTO: 8.5 % (ref 0–13.4)
MORPHOLOGY BLD-IMP: NORMAL
MYELOCYTES NFR BLD MANUAL: 2.6 %
NEUTROPHILS # BLD AUTO: 8.01 K/UL (ref 1.82–7.42)
NEUTROPHILS NFR BLD: 59.8 % (ref 44–72)
NEUTS BAND NFR BLD MANUAL: 0.9 % (ref 0–10)
NRBC # BLD AUTO: 0.08 K/UL
NRBC BLD-RTO: 0.6 /100 WBC (ref 0–0.2)
OVALOCYTES BLD QL SMEAR: NORMAL
PLATELET # BLD AUTO: 362 K/UL (ref 164–446)
PLATELET BLD QL SMEAR: NORMAL
PMV BLD AUTO: 10.2 FL (ref 9–12.9)
POIKILOCYTOSIS BLD QL SMEAR: NORMAL
POTASSIUM SERPL-SCNC: 3.7 MMOL/L (ref 3.6–5.5)
PROMYELOCYTES NFR BLD MANUAL: 1.7 %
PROT SERPL-MCNC: 6.8 G/DL (ref 6–8.2)
RBC # BLD AUTO: 3.43 M/UL (ref 4.2–5.4)
RBC BLD AUTO: PRESENT
SODIUM SERPL-SCNC: 140 MMOL/L (ref 135–145)
TOXIC GRANULES BLD QL SMEAR: NORMAL
WBC # BLD AUTO: 13.2 K/UL (ref 4.8–10.8)

## 2024-02-26 PROCEDURE — 80053 COMPREHEN METABOLIC PANEL: CPT

## 2024-02-26 PROCEDURE — 85007 BL SMEAR W/DIFF WBC COUNT: CPT

## 2024-02-26 PROCEDURE — 85027 COMPLETE CBC AUTOMATED: CPT

## 2024-02-26 PROCEDURE — 36415 COLL VENOUS BLD VENIPUNCTURE: CPT

## 2024-03-11 ENCOUNTER — HOSPITAL ENCOUNTER (OUTPATIENT)
Dept: LAB | Facility: MEDICAL CENTER | Age: 69
End: 2024-03-11
Attending: INTERNAL MEDICINE
Payer: COMMERCIAL

## 2024-03-11 LAB
ALBUMIN SERPL BCP-MCNC: 3.9 G/DL (ref 3.2–4.9)
ALBUMIN/GLOB SERPL: 1.4 G/DL
ALP SERPL-CCNC: 113 U/L (ref 30–99)
ALT SERPL-CCNC: 8 U/L (ref 2–50)
ANION GAP SERPL CALC-SCNC: 13 MMOL/L (ref 7–16)
AST SERPL-CCNC: 19 U/L (ref 12–45)
BASOPHILS # BLD AUTO: 0 % (ref 0–1.8)
BASOPHILS # BLD: 0 K/UL (ref 0–0.12)
BILIRUB SERPL-MCNC: 0.2 MG/DL (ref 0.1–1.5)
BUN SERPL-MCNC: 6 MG/DL (ref 8–22)
CALCIUM ALBUM COR SERPL-MCNC: 9.5 MG/DL (ref 8.5–10.5)
CALCIUM SERPL-MCNC: 9.4 MG/DL (ref 8.5–10.5)
CHLORIDE SERPL-SCNC: 104 MMOL/L (ref 96–112)
CO2 SERPL-SCNC: 23 MMOL/L (ref 20–33)
CREAT SERPL-MCNC: 0.54 MG/DL (ref 0.5–1.4)
EOSINOPHIL # BLD AUTO: 0 K/UL (ref 0–0.51)
EOSINOPHIL NFR BLD: 0 % (ref 0–6.9)
ERYTHROCYTE [DISTWIDTH] IN BLOOD BY AUTOMATED COUNT: 44.7 FL (ref 35.9–50)
GFR SERPLBLD CREATININE-BSD FMLA CKD-EPI: 100 ML/MIN/1.73 M 2
GLOBULIN SER CALC-MCNC: 2.8 G/DL (ref 1.9–3.5)
GLUCOSE SERPL-MCNC: 113 MG/DL (ref 65–99)
HCT VFR BLD AUTO: 28.4 % (ref 37–47)
HGB BLD-MCNC: 9.8 G/DL (ref 12–16)
LYMPHOCYTES # BLD AUTO: 2.21 K/UL (ref 1–4.8)
LYMPHOCYTES NFR BLD: 17.1 % (ref 22–41)
MANUAL DIFF BLD: NORMAL
MCH RBC QN AUTO: 32.2 PG (ref 27–33)
MCHC RBC AUTO-ENTMCNC: 34.5 G/DL (ref 32.2–35.5)
MCV RBC AUTO: 93.4 FL (ref 81.4–97.8)
METAMYELOCYTES NFR BLD MANUAL: 1.7 %
MONOCYTES # BLD AUTO: 0.99 K/UL (ref 0–0.85)
MONOCYTES NFR BLD AUTO: 7.7 % (ref 0–13.4)
MORPHOLOGY BLD-IMP: NORMAL
NEUTROPHILS # BLD AUTO: 9.15 K/UL (ref 1.82–7.42)
NEUTROPHILS NFR BLD: 70.9 % (ref 44–72)
NRBC # BLD AUTO: 0.19 K/UL
NRBC BLD-RTO: 1.5 /100 WBC (ref 0–0.2)
OVALOCYTES BLD QL SMEAR: NORMAL
PLATELET # BLD AUTO: 220 K/UL (ref 164–446)
PLATELET BLD QL SMEAR: NORMAL
PMV BLD AUTO: 10.5 FL (ref 9–12.9)
POIKILOCYTOSIS BLD QL SMEAR: NORMAL
POTASSIUM SERPL-SCNC: 3.6 MMOL/L (ref 3.6–5.5)
PROMYELOCYTES NFR BLD MANUAL: 2.6 %
PROT SERPL-MCNC: 6.7 G/DL (ref 6–8.2)
RBC # BLD AUTO: 3.04 M/UL (ref 4.2–5.4)
RBC BLD AUTO: PRESENT
SODIUM SERPL-SCNC: 140 MMOL/L (ref 135–145)
WBC # BLD AUTO: 12.9 K/UL (ref 4.8–10.8)

## 2024-03-11 PROCEDURE — 36415 COLL VENOUS BLD VENIPUNCTURE: CPT

## 2024-03-11 PROCEDURE — 85007 BL SMEAR W/DIFF WBC COUNT: CPT

## 2024-03-11 PROCEDURE — 85027 COMPLETE CBC AUTOMATED: CPT

## 2024-03-11 PROCEDURE — 80053 COMPREHEN METABOLIC PANEL: CPT

## 2024-03-14 ENCOUNTER — HOSPITAL ENCOUNTER (OUTPATIENT)
Facility: MEDICAL CENTER | Age: 69
End: 2024-03-14
Payer: COMMERCIAL

## 2024-03-14 LAB
FERRITIN SERPL-MCNC: 578 NG/ML (ref 10–291)
FOLATE SERPL-MCNC: 19.9 NG/ML
IRON SATN MFR SERPL: 21 % (ref 15–55)
IRON SERPL-MCNC: 46 UG/DL (ref 40–170)
TIBC SERPL-MCNC: 215 UG/DL (ref 250–450)
UIBC SERPL-MCNC: 169 UG/DL (ref 110–370)
VIT B12 SERPL-MCNC: >4000 PG/ML (ref 211–911)

## 2024-03-14 PROCEDURE — 83550 IRON BINDING TEST: CPT

## 2024-03-14 PROCEDURE — 82728 ASSAY OF FERRITIN: CPT

## 2024-03-14 PROCEDURE — 82607 VITAMIN B-12: CPT

## 2024-03-14 PROCEDURE — 83540 ASSAY OF IRON: CPT

## 2024-03-14 PROCEDURE — 82746 ASSAY OF FOLIC ACID SERUM: CPT

## 2024-03-18 ENCOUNTER — APPOINTMENT (OUTPATIENT)
Dept: RADIOLOGY | Facility: MEDICAL CENTER | Age: 69
DRG: 173 | End: 2024-03-18
Attending: STUDENT IN AN ORGANIZED HEALTH CARE EDUCATION/TRAINING PROGRAM
Payer: COMMERCIAL

## 2024-03-18 ENCOUNTER — APPOINTMENT (OUTPATIENT)
Dept: RADIOLOGY | Facility: MEDICAL CENTER | Age: 69
End: 2024-03-18
Attending: EMERGENCY MEDICINE
Payer: COMMERCIAL

## 2024-03-18 ENCOUNTER — HOSPITAL ENCOUNTER (INPATIENT)
Facility: MEDICAL CENTER | Age: 69
LOS: 2 days | DRG: 173 | End: 2024-03-20
Attending: INTERNAL MEDICINE | Admitting: STUDENT IN AN ORGANIZED HEALTH CARE EDUCATION/TRAINING PROGRAM
Payer: COMMERCIAL

## 2024-03-18 ENCOUNTER — APPOINTMENT (OUTPATIENT)
Dept: CARDIOLOGY | Facility: MEDICAL CENTER | Age: 69
End: 2024-03-18
Attending: EMERGENCY MEDICINE
Payer: COMMERCIAL

## 2024-03-18 ENCOUNTER — HOSPITAL ENCOUNTER (EMERGENCY)
Facility: MEDICAL CENTER | Age: 69
End: 2024-03-18
Attending: EMERGENCY MEDICINE
Payer: COMMERCIAL

## 2024-03-18 VITALS
HEIGHT: 64 IN | HEART RATE: 88 BPM | WEIGHT: 247.14 LBS | TEMPERATURE: 98.1 F | RESPIRATION RATE: 22 BRPM | OXYGEN SATURATION: 95 % | BODY MASS INDEX: 42.19 KG/M2 | SYSTOLIC BLOOD PRESSURE: 133 MMHG | DIASTOLIC BLOOD PRESSURE: 74 MMHG

## 2024-03-18 DIAGNOSIS — I26.99 BILATERAL PULMONARY EMBOLISM (HCC): ICD-10-CM

## 2024-03-18 DIAGNOSIS — I26.94 MULTIPLE SUBSEGMENTAL PULMONARY EMBOLI WITHOUT ACUTE COR PULMONALE (HCC): ICD-10-CM

## 2024-03-18 DIAGNOSIS — C50.919 MALIGNANT NEOPLASM OF FEMALE BREAST, UNSPECIFIED ESTROGEN RECEPTOR STATUS, UNSPECIFIED LATERALITY, UNSPECIFIED SITE OF BREAST (HCC): ICD-10-CM

## 2024-03-18 LAB
ALBUMIN SERPL BCP-MCNC: 3.9 G/DL (ref 3.2–4.9)
ALBUMIN/GLOB SERPL: 1.5 G/DL
ALP SERPL-CCNC: 163 U/L (ref 30–99)
ALT SERPL-CCNC: 28 U/L (ref 2–50)
AMORPH CRY #/AREA URNS HPF: PRESENT /HPF
ANION GAP SERPL CALC-SCNC: 15 MMOL/L (ref 7–16)
ANISOCYTOSIS BLD QL SMEAR: ABNORMAL
APPEARANCE UR: ABNORMAL
AST SERPL-CCNC: 39 U/L (ref 12–45)
BACTERIA #/AREA URNS HPF: ABNORMAL /HPF
BASOPHILS # BLD AUTO: 0 % (ref 0–1.8)
BASOPHILS # BLD: 0 K/UL (ref 0–0.12)
BILIRUB SERPL-MCNC: 0.9 MG/DL (ref 0.1–1.5)
BILIRUB UR QL STRIP.AUTO: NEGATIVE
BUN SERPL-MCNC: 27 MG/DL (ref 8–22)
CALCIUM ALBUM COR SERPL-MCNC: 9.5 MG/DL (ref 8.5–10.5)
CALCIUM SERPL-MCNC: 9.4 MG/DL (ref 8.4–10.2)
CHLORIDE SERPL-SCNC: 102 MMOL/L (ref 96–112)
CO2 SERPL-SCNC: 22 MMOL/L (ref 20–33)
COLOR UR: YELLOW
CREAT SERPL-MCNC: 0.43 MG/DL (ref 0.5–1.4)
EKG IMPRESSION: NORMAL
EOSINOPHIL # BLD AUTO: 0 K/UL (ref 0–0.51)
EOSINOPHIL NFR BLD: 0 % (ref 0–6.9)
EPI CELLS #/AREA URNS HPF: ABNORMAL /HPF
ERYTHROCYTE [DISTWIDTH] IN BLOOD BY AUTOMATED COUNT: 51.1 FL (ref 35.9–50)
FLUAV RNA SPEC QL NAA+PROBE: NEGATIVE
FLUBV RNA SPEC QL NAA+PROBE: NEGATIVE
GFR SERPLBLD CREATININE-BSD FMLA CKD-EPI: 105 ML/MIN/1.73 M 2
GLOBULIN SER CALC-MCNC: 2.6 G/DL (ref 1.9–3.5)
GLUCOSE SERPL-MCNC: 119 MG/DL (ref 65–99)
GLUCOSE UR STRIP.AUTO-MCNC: NEGATIVE MG/DL
HCT VFR BLD AUTO: 27.7 % (ref 37–47)
HGB BLD-MCNC: 9.5 G/DL (ref 12–16)
KETONES UR STRIP.AUTO-MCNC: NEGATIVE MG/DL
LACTATE SERPL-SCNC: 1.4 MMOL/L (ref 0.5–2)
LEUKOCYTE ESTERASE UR QL STRIP.AUTO: NEGATIVE
LG PLATELETS BLD QL SMEAR: NORMAL
LV EJECT FRACT  99904: 65
LV EJECT FRACT MOD 2C 99903: 62.56
LV EJECT FRACT MOD 4C 99902: 62.21
LV EJECT FRACT MOD BP 99901: 61.91
LYMPHOCYTES # BLD AUTO: 1.31 K/UL (ref 1–4.8)
LYMPHOCYTES NFR BLD: 3 % (ref 22–41)
MACROCYTES BLD QL SMEAR: ABNORMAL
MANUAL DIFF BLD: NORMAL
MCH RBC QN AUTO: 33.9 PG (ref 27–33)
MCHC RBC AUTO-ENTMCNC: 34.3 G/DL (ref 32.2–35.5)
MCV RBC AUTO: 98.9 FL (ref 81.4–97.8)
MICRO URNS: ABNORMAL
MONOCYTES # BLD AUTO: 0.44 K/UL (ref 0–0.85)
MONOCYTES NFR BLD AUTO: 1 % (ref 0–13.4)
NEUTROPHILS # BLD AUTO: 41.95 K/UL (ref 1.82–7.42)
NEUTROPHILS NFR BLD: 95 % (ref 44–72)
NEUTS BAND NFR BLD MANUAL: 1 % (ref 0–10)
NITRITE UR QL STRIP.AUTO: NEGATIVE
NRBC # BLD AUTO: 0 K/UL
NRBC BLD-RTO: 0 /100 WBC (ref 0–0.2)
NT-PROBNP SERPL IA-MCNC: 7267 PG/ML (ref 0–125)
OVALOCYTES BLD QL SMEAR: NORMAL
PH UR STRIP.AUTO: 5.5 [PH] (ref 5–8)
PLATELET # BLD AUTO: 233 K/UL (ref 164–446)
PLATELET BLD QL SMEAR: NORMAL
PMV BLD AUTO: 10 FL (ref 9–12.9)
POLYCHROMASIA BLD QL SMEAR: NORMAL
POTASSIUM SERPL-SCNC: 4.5 MMOL/L (ref 3.6–5.5)
PROT SERPL-MCNC: 6.5 G/DL (ref 6–8.2)
PROT UR QL STRIP: NEGATIVE MG/DL
RBC # BLD AUTO: 2.8 M/UL (ref 4.2–5.4)
RBC # URNS HPF: ABNORMAL /HPF
RBC BLD AUTO: PRESENT
RBC UR QL AUTO: NEGATIVE
RSV RNA SPEC QL NAA+PROBE: NEGATIVE
SARS-COV-2 RNA RESP QL NAA+PROBE: NOTDETECTED
SODIUM SERPL-SCNC: 139 MMOL/L (ref 135–145)
SP GR UR STRIP.AUTO: 1.02
SPECIMEN SOURCE: NORMAL
TROPONIN T SERPL-MCNC: 46 NG/L (ref 6–19)
TROPONIN T SERPL-MCNC: 48 NG/L (ref 6–19)
WBC # BLD AUTO: 43.7 K/UL (ref 4.8–10.8)
WBC #/AREA URNS HPF: ABNORMAL /HPF
WBC TOXIC VACUOLES BLD QL SMEAR: NORMAL

## 2024-03-18 PROCEDURE — 75743 ARTERY X-RAYS LUNGS: CPT

## 2024-03-18 PROCEDURE — 85027 COMPLETE CBC AUTOMATED: CPT

## 2024-03-18 PROCEDURE — 86850 RBC ANTIBODY SCREEN: CPT

## 2024-03-18 PROCEDURE — 86901 BLOOD TYPING SEROLOGIC RH(D): CPT

## 2024-03-18 PROCEDURE — 80048 BASIC METABOLIC PNL TOTAL CA: CPT

## 2024-03-18 PROCEDURE — 83880 ASSAY OF NATRIURETIC PEPTIDE: CPT

## 2024-03-18 PROCEDURE — 36415 COLL VENOUS BLD VENIPUNCTURE: CPT

## 2024-03-18 PROCEDURE — 700117 HCHG RX CONTRAST REV CODE 255: Performed by: STUDENT IN AN ORGANIZED HEALTH CARE EDUCATION/TRAINING PROGRAM

## 2024-03-18 PROCEDURE — 770000 HCHG ROOM/CARE - INTERMEDIATE ICU *

## 2024-03-18 PROCEDURE — 700117 HCHG RX CONTRAST REV CODE 255: Performed by: EMERGENCY MEDICINE

## 2024-03-18 PROCEDURE — 87040 BLOOD CULTURE FOR BACTERIA: CPT | Mod: 91

## 2024-03-18 PROCEDURE — 3E04317 INTRODUCTION OF OTHER THROMBOLYTIC INTO CENTRAL VEIN, PERCUTANEOUS APPROACH: ICD-10-PCS | Performed by: STUDENT IN AN ORGANIZED HEALTH CARE EDUCATION/TRAINING PROGRAM

## 2024-03-18 PROCEDURE — 85007 BL SMEAR W/DIFF WBC COUNT: CPT | Mod: 91

## 2024-03-18 PROCEDURE — 96374 THER/PROPH/DIAG INJ IV PUSH: CPT

## 2024-03-18 PROCEDURE — 85384 FIBRINOGEN ACTIVITY: CPT

## 2024-03-18 PROCEDURE — 87015 SPECIMEN INFECT AGNT CONCNTJ: CPT

## 2024-03-18 PROCEDURE — 02FQ3Z0 FRAGMENTATION OF RIGHT PULMONARY ARTERY, PERCUTANEOUS APPROACH, ULTRASONIC: ICD-10-PCS | Performed by: STUDENT IN AN ORGANIZED HEALTH CARE EDUCATION/TRAINING PROGRAM

## 2024-03-18 PROCEDURE — 93005 ELECTROCARDIOGRAM TRACING: CPT

## 2024-03-18 PROCEDURE — 700111 HCHG RX REV CODE 636 W/ 250 OVERRIDE (IP): Performed by: EMERGENCY MEDICINE

## 2024-03-18 PROCEDURE — 85730 THROMBOPLASTIN TIME PARTIAL: CPT

## 2024-03-18 PROCEDURE — 83605 ASSAY OF LACTIC ACID: CPT

## 2024-03-18 PROCEDURE — 96375 TX/PRO/DX INJ NEW DRUG ADDON: CPT

## 2024-03-18 PROCEDURE — 87077 CULTURE AEROBIC IDENTIFY: CPT | Mod: 91

## 2024-03-18 PROCEDURE — 700111 HCHG RX REV CODE 636 W/ 250 OVERRIDE (IP)

## 2024-03-18 PROCEDURE — 99285 EMERGENCY DEPT VISIT HI MDM: CPT

## 2024-03-18 PROCEDURE — 84484 ASSAY OF TROPONIN QUANT: CPT | Mod: 91

## 2024-03-18 PROCEDURE — 85027 COMPLETE CBC AUTOMATED: CPT | Mod: 91

## 2024-03-18 PROCEDURE — 51798 US URINE CAPACITY MEASURE: CPT

## 2024-03-18 PROCEDURE — 80053 COMPREHEN METABOLIC PANEL: CPT

## 2024-03-18 PROCEDURE — 85520 HEPARIN ASSAY: CPT

## 2024-03-18 PROCEDURE — 0241U HCHG SARS-COV-2 COVID-19 NFCT DS RESP RNA 4 TRGT MIC: CPT

## 2024-03-18 PROCEDURE — 93306 TTE W/DOPPLER COMPLETE: CPT | Mod: 26 | Performed by: INTERNAL MEDICINE

## 2024-03-18 PROCEDURE — 700102 HCHG RX REV CODE 250 W/ 637 OVERRIDE(OP): Performed by: EMERGENCY MEDICINE

## 2024-03-18 PROCEDURE — 87086 URINE CULTURE/COLONY COUNT: CPT

## 2024-03-18 PROCEDURE — 85007 BL SMEAR W/DIFF WBC COUNT: CPT

## 2024-03-18 PROCEDURE — 93306 TTE W/DOPPLER COMPLETE: CPT

## 2024-03-18 PROCEDURE — 81001 URINALYSIS AUTO W/SCOPE: CPT

## 2024-03-18 PROCEDURE — 86900 BLOOD TYPING SEROLOGIC ABO: CPT

## 2024-03-18 PROCEDURE — 71045 X-RAY EXAM CHEST 1 VIEW: CPT

## 2024-03-18 PROCEDURE — 700111 HCHG RX REV CODE 636 W/ 250 OVERRIDE (IP): Mod: JZ | Performed by: STUDENT IN AN ORGANIZED HEALTH CARE EDUCATION/TRAINING PROGRAM

## 2024-03-18 PROCEDURE — 71275 CT ANGIOGRAPHY CHEST: CPT

## 2024-03-18 PROCEDURE — 85610 PROTHROMBIN TIME: CPT

## 2024-03-18 PROCEDURE — 02FR3Z0 FRAGMENTATION OF LEFT PULMONARY ARTERY, PERCUTANEOUS APPROACH, ULTRASONIC: ICD-10-PCS | Performed by: STUDENT IN AN ORGANIZED HEALTH CARE EDUCATION/TRAINING PROGRAM

## 2024-03-18 PROCEDURE — 37211 THROMBOLYTIC ART THERAPY: CPT

## 2024-03-18 PROCEDURE — 93005 ELECTROCARDIOGRAM TRACING: CPT | Performed by: EMERGENCY MEDICINE

## 2024-03-18 PROCEDURE — 700105 HCHG RX REV CODE 258: Performed by: EMERGENCY MEDICINE

## 2024-03-18 PROCEDURE — 99285 EMERGENCY DEPT VISIT HI MDM: CPT | Performed by: STUDENT IN AN ORGANIZED HEALTH CARE EDUCATION/TRAINING PROGRAM

## 2024-03-18 PROCEDURE — 96372 THER/PROPH/DIAG INJ SC/IM: CPT

## 2024-03-18 PROCEDURE — A9270 NON-COVERED ITEM OR SERVICE: HCPCS | Performed by: EMERGENCY MEDICINE

## 2024-03-18 RX ORDER — HEPARIN SODIUM 5000 [USP'U]/100ML
0-30 INJECTION, SOLUTION INTRAVENOUS CONTINUOUS
Status: DISCONTINUED | OUTPATIENT
Start: 2024-03-19 | End: 2024-03-19

## 2024-03-18 RX ORDER — SODIUM CHLORIDE 9 MG/ML
INJECTION, SOLUTION INTRAVENOUS CONTINUOUS
Status: ACTIVE | OUTPATIENT
Start: 2024-03-18 | End: 2024-03-19

## 2024-03-18 RX ORDER — ACETAMINOPHEN 500 MG
1000 TABLET ORAL EVERY 6 HOURS PRN
COMMUNITY

## 2024-03-18 RX ORDER — SODIUM CHLORIDE, SODIUM LACTATE, POTASSIUM CHLORIDE, CALCIUM CHLORIDE 600; 310; 30; 20 MG/100ML; MG/100ML; MG/100ML; MG/100ML
1000 INJECTION, SOLUTION INTRAVENOUS ONCE
Status: COMPLETED | OUTPATIENT
Start: 2024-03-18 | End: 2024-03-18

## 2024-03-18 RX ORDER — ONDANSETRON 2 MG/ML
4 INJECTION INTRAMUSCULAR; INTRAVENOUS PRN
Status: ACTIVE | OUTPATIENT
Start: 2024-03-18 | End: 2024-03-19

## 2024-03-18 RX ORDER — CEFTRIAXONE 2 G/1
2000 INJECTION, POWDER, FOR SOLUTION INTRAMUSCULAR; INTRAVENOUS ONCE
Status: COMPLETED | OUTPATIENT
Start: 2024-03-18 | End: 2024-03-18

## 2024-03-18 RX ORDER — DIPHENHYDRAMINE HYDROCHLORIDE 50 MG/ML
50 INJECTION INTRAMUSCULAR; INTRAVENOUS ONCE
Status: COMPLETED | OUTPATIENT
Start: 2024-03-18 | End: 2024-03-18

## 2024-03-18 RX ORDER — MIDAZOLAM HYDROCHLORIDE 1 MG/ML
.5-2 INJECTION INTRAMUSCULAR; INTRAVENOUS PRN
Status: ACTIVE | OUTPATIENT
Start: 2024-03-18 | End: 2024-03-19

## 2024-03-18 RX ORDER — ACETAMINOPHEN 325 MG/1
650 TABLET ORAL ONCE
Status: COMPLETED | OUTPATIENT
Start: 2024-03-18 | End: 2024-03-18

## 2024-03-18 RX ORDER — ONDANSETRON 4 MG/1
4 TABLET, ORALLY DISINTEGRATING ORAL EVERY 6 HOURS PRN
COMMUNITY

## 2024-03-18 RX ORDER — METHYLPREDNISOLONE SODIUM SUCCINATE 125 MG/2ML
125 INJECTION, POWDER, LYOPHILIZED, FOR SOLUTION INTRAMUSCULAR; INTRAVENOUS ONCE
Status: COMPLETED | OUTPATIENT
Start: 2024-03-18 | End: 2024-03-18

## 2024-03-18 RX ORDER — ENOXAPARIN SODIUM 150 MG/ML
1 INJECTION SUBCUTANEOUS ONCE
Status: COMPLETED | OUTPATIENT
Start: 2024-03-18 | End: 2024-03-18

## 2024-03-18 RX ORDER — SODIUM CHLORIDE 9 MG/ML
INJECTION, SOLUTION INTRAVENOUS CONTINUOUS
Status: DISCONTINUED | OUTPATIENT
Start: 2024-03-18 | End: 2024-03-18

## 2024-03-18 RX ORDER — DIPHENHYDRAMINE HYDROCHLORIDE 50 MG/ML
INJECTION INTRAMUSCULAR; INTRAVENOUS
Status: COMPLETED
Start: 2024-03-18 | End: 2024-03-18

## 2024-03-18 RX ORDER — HEPARIN SODIUM 1000 [USP'U]/ML
40 INJECTION, SOLUTION INTRAVENOUS; SUBCUTANEOUS PRN
Status: DISCONTINUED | OUTPATIENT
Start: 2024-03-19 | End: 2024-03-19

## 2024-03-18 RX ORDER — HEPARIN SODIUM 5000 [USP'U]/100ML
0-30 INJECTION, SOLUTION INTRAVENOUS CONTINUOUS
Status: DISCONTINUED | OUTPATIENT
Start: 2024-03-19 | End: 2024-03-18

## 2024-03-18 RX ORDER — HEPARIN SODIUM 5000 [USP'U]/100ML
500 INJECTION, SOLUTION INTRAVENOUS CONTINUOUS
Status: ACTIVE | OUTPATIENT
Start: 2024-03-18 | End: 2024-03-19

## 2024-03-18 RX ORDER — METHYLPREDNISOLONE SODIUM SUCCINATE 40 MG/ML
40 INJECTION, POWDER, LYOPHILIZED, FOR SOLUTION INTRAMUSCULAR; INTRAVENOUS ONCE
Status: COMPLETED | OUTPATIENT
Start: 2024-03-18 | End: 2024-03-18

## 2024-03-18 RX ORDER — HEPARIN SODIUM 1000 [USP'U]/ML
INJECTION, SOLUTION INTRAVENOUS; SUBCUTANEOUS
Status: DISCONTINUED
Start: 2024-03-18 | End: 2024-03-19

## 2024-03-18 RX ORDER — OLANZAPINE 5 MG/1
5 TABLET ORAL DAILY
COMMUNITY

## 2024-03-18 RX ORDER — LOSARTAN POTASSIUM 100 MG/1
100 TABLET ORAL DAILY
COMMUNITY

## 2024-03-18 RX ORDER — HEPARIN SODIUM 5000 [USP'U]/100ML
INJECTION, SOLUTION INTRAVENOUS
Status: DISCONTINUED
Start: 2024-03-18 | End: 2024-03-19

## 2024-03-18 RX ORDER — ONDANSETRON 2 MG/ML
4 INJECTION INTRAMUSCULAR; INTRAVENOUS EVERY 4 HOURS PRN
Status: DISCONTINUED | OUTPATIENT
Start: 2024-03-18 | End: 2024-03-20 | Stop reason: HOSPADM

## 2024-03-18 RX ORDER — METHYLPREDNISOLONE SODIUM SUCCINATE 125 MG/2ML
INJECTION, POWDER, LYOPHILIZED, FOR SOLUTION INTRAMUSCULAR; INTRAVENOUS
Status: DISCONTINUED
Start: 2024-03-18 | End: 2024-03-19

## 2024-03-18 RX ORDER — LIDOCAINE AND PRILOCAINE 25; 25 MG/G; MG/G
1 CREAM TOPICAL PRN
COMMUNITY

## 2024-03-18 RX ORDER — SODIUM CHLORIDE 9 MG/ML
500 INJECTION, SOLUTION INTRAVENOUS
Status: ACTIVE | OUTPATIENT
Start: 2024-03-18 | End: 2024-03-19

## 2024-03-18 RX ADMIN — SODIUM CHLORIDE, POTASSIUM CHLORIDE, SODIUM LACTATE AND CALCIUM CHLORIDE 1000 ML: 600; 310; 30; 20 INJECTION, SOLUTION INTRAVENOUS at 17:18

## 2024-03-18 RX ADMIN — DIPHENHYDRAMINE HYDROCHLORIDE 50 MG: 50 INJECTION INTRAMUSCULAR; INTRAVENOUS at 21:44

## 2024-03-18 RX ADMIN — IOHEXOL 15 ML: 300 INJECTION, SOLUTION INTRAVENOUS at 23:00

## 2024-03-18 RX ADMIN — DIPHENHYDRAMINE HYDROCHLORIDE 50 MG: 50 INJECTION, SOLUTION INTRAMUSCULAR; INTRAVENOUS at 16:20

## 2024-03-18 RX ADMIN — CEFTRIAXONE SODIUM 2000 MG: 2 INJECTION, POWDER, FOR SOLUTION INTRAMUSCULAR; INTRAVENOUS at 17:42

## 2024-03-18 RX ADMIN — IOHEXOL 72 ML: 350 INJECTION, SOLUTION INTRAVENOUS at 17:03

## 2024-03-18 RX ADMIN — DIPHENHYDRAMINE HYDROCHLORIDE 50 MG: 50 INJECTION, SOLUTION INTRAMUSCULAR; INTRAVENOUS at 21:44

## 2024-03-18 RX ADMIN — METHYLPREDNISOLONE SODIUM SUCCINATE 125 MG: 125 INJECTION, POWDER, FOR SOLUTION INTRAMUSCULAR; INTRAVENOUS at 16:20

## 2024-03-18 RX ADMIN — ACETAMINOPHEN 650 MG: 325 TABLET ORAL at 16:20

## 2024-03-18 RX ADMIN — ENOXAPARIN SODIUM 120 MG: 150 INJECTION SUBCUTANEOUS at 17:40

## 2024-03-18 RX ADMIN — METHYLPREDNISOLONE SODIUM SUCCINATE 40 MG: 125 INJECTION, POWDER, FOR SOLUTION INTRAMUSCULAR; INTRAVENOUS at 21:44

## 2024-03-18 ASSESSMENT — ENCOUNTER SYMPTOMS
VOMITING: 0
HEARTBURN: 0
SHORTNESS OF BREATH: 1
NAUSEA: 1
WEAKNESS: 1
FALLS: 0
FOCAL WEAKNESS: 0

## 2024-03-18 ASSESSMENT — FIBROSIS 4 INDEX
FIB4 SCORE: 2.15
FIB4 SCORE: 2.08

## 2024-03-18 NOTE — ED NOTES
Medication history reviewed with pt. Med rec is complete.  Allergies reviewed, per pt  Interviewed pt with daughter at bedside with permission from pt.    Pt reports that she takes her OLANZAPINE 5MG in the morning.    Pt receives her chemo treatment from [facility] ([phone]) every 14 days. Last treatment was on 3/14/2024.    Patient has not had any outpatient antibiotics in the last 30 days.    Pt is not on any anticoagulants

## 2024-03-18 NOTE — ED PROVIDER NOTES
"ED Provider Note    CHIEF COMPLAINT  Chief Complaint   Patient presents with    Shortness of Breath     Denies cold sx. Reports SOB since Saturday, worse today. Reports also \"my bones were aching across my chest\". Pt. Is on chemo for breast CA. Denies known mets.        EXTERNAL RECORDS REVIEWED  Patient's last encounter in our EMR is from the [facility] medicine clinic, this was January 2022 she was seen for medication refill.  Noted history at that time of hypertension.    In April 2021 patient underwent vitrectomy.    HPI/ROS  LIMITATION TO HISTORY   Select: : None  OUTSIDE HISTORIAN(S):  Family daughter    Haylee Jean-Baptiste is a 68 y.o. female who presents to the emergency department.  She is accompanied by her daughter.  Patient presents with shortness of breath.  She got her fourth round of chemotherapy this past Thursday.  She started feeling achy afterwards.  She states by Saturday, she had achiness which she describes \"in her bones\" across her chest and in her shoulders bilaterally.  This progressively worsened.  To the point where she has had to make multiple attempts just to get her laundry into her house.  Ultimately she was unsuccessful and it still sits in her garage.  She typically drives herself to [place] where she receives her chemotherapy treatment and stays there.  She lives in [place].  Her daughter is at the bedside and states that the symptoms are very abnormal for her mother.  She is not oxygen dependent.  She is still working and managing her own activities of daily living and her current condition is markedly changed.  She denies a cough or fever.  She has struggled with nausea and vomiting for which she has been prescribed an antiemetic.  She denies having pain except the achiness across her chest.  She states she is gradually lost weight.  No diarrhea.  No urinary complaints.  She has a history of hypertension.  She is not anticoagulated.  No history of diabetes.  Dr. Vivar is her oncologist.  " "She has a history of breast cancer.    PAST MEDICAL HISTORY   has a past medical history of Allergy, Anxiety (08/28/2020), Cancer (HCC), Headache(784.0), Heart burn (02/18/2021), Hypertension, and Migraine.    SURGICAL HISTORY   has a past surgical history that includes abdominal hysterectomy total; tonsillectomy; wrist fusion; open reduction; lipoma excision; and vitrectomy posterior (Right, 4/27/2021).    FAMILY HISTORY  Family History   Problem Relation Age of Onset    Diabetes Mother     Hypertension Mother     Stroke Mother     Diabetes Sister     Hypertension Sister     Diabetes Brother     Hypertension Brother     Cancer Brother         lung, testicular    Dementia Father 70        Alzheimer's    Lung Disease Paternal Uncle         emphysema       SOCIAL HISTORY  Social History     Tobacco Use    Smoking status: Never    Smokeless tobacco: Never   Vaping Use    Vaping Use: Never used   Substance and Sexual Activity    Alcohol use: No    Drug use: No    Sexual activity: Not Currently       CURRENT MEDICATIONS  Home Medications       Reviewed by Blanca England (Pharmacy Tech) on 03/18/24 at 1600  Med List Status: Complete     Medication Last Dose Status   acetaminophen (TYLENOL) 500 MG Tab 3/16/2024 Active   ALPRAZolam (XANAX) 0.25 MG Tab > 2 weeks Active   Cranberry-Vitamin C (AZO CRANBERRY URINARY TRACT PO) > 1 week Active   lidocaine-prilocaine (EMLA) 2.5-2.5 % Cream 3/14/2024 Active   losartan (COZAAR) 100 MG Tab 3/17/2024 Active   Multiple Vitamins-Minerals (CENTRUM ADULT PO) 3/18/2024 Active   OLANZapine (ZYPREXA) 5 MG Tab 3/18/2024 Active   ondansetron (ZOFRAN ODT) 4 MG TABLET DISPERSIBLE > 2 weeks Active                    ALLERGIES  Allergies   Allergen Reactions    Iodine      hives    Pcn [Penicillins] Hives       PHYSICAL EXAM  VITAL SIGNS: /74   Pulse 88   Temp 36.7 °C (98.1 °F) (Temporal)   Resp (!) 22   Ht 1.626 m (5' 4\")   Wt 112 kg (247 lb 2.2 oz)   SpO2 95%   BMI 42.42 " kg/m²    Vitals reviewed.  Constitutional: Patient is oriented to person, place, and time. Ill appearing, conversational dyspnes. Mild distress.    Head: Normocephalic and atraumatic.   Mouth/Throat: Oropharynx is clear   Eyes: Conjunctivae are normal. Pupils are equal, round  Neck: Normal range of motion. Neck supple.   Cardiovascular: Normal rate, regular rhythm and normal heart sounds. Normal peripheral pulses.  Pulmonary/Chest: Tachypnea.  Mild increased work of breathing.  No abnormal skin findings across chest wall.  No rhonchi, no rales.  No wheezing.  Mild conversational dyspnea. No respiratory distress.  Abdominal: Soft. Bowel sounds are normal. There is no tenderness, rebound or guarding, or peritoneal signs. No CVA tenderness.  Musculoskeletal: No edema and no tenderness.   Lymphadenopathy: No cervical adenopathy.   Neurological: No cranial nerve deficits. Normal motor and sensory exam. No focal deficits.   Skin: Skin is warm and dry. No erythema. No pallor.   Psychiatric: Patient has a normal mood and affect.     DIAGNOSTIC STUDIES / PROCEDURES  EKG  I have independently interpreted this EKG    LABS  Results for orders placed or performed during the hospital encounter of 03/18/24   EC-ECHOCARDIOGRAM COMPLETE W/O CONT   Result Value Ref Range    Eject.Frac. MOD BP 61.91     Eject.Frac. MOD 4C 62.21     Eject.Frac. MOD 2C 62.56     Left Ventrical Ejection Fraction 65    Lactic Acid   Result Value Ref Range    Lactic Acid 1.4 0.5 - 2.0 mmol/L   CBC with Differential   Result Value Ref Range    WBC 43.7 (H) 4.8 - 10.8 K/uL    RBC 2.80 (L) 4.20 - 5.40 M/uL    Hemoglobin 9.5 (L) 12.0 - 16.0 g/dL    Hematocrit 27.7 (L) 37.0 - 47.0 %    MCV 98.9 (H) 81.4 - 97.8 fL    MCH 33.9 (H) 27.0 - 33.0 pg    MCHC 34.3 32.2 - 35.5 g/dL    RDW 51.1 (H) 35.9 - 50.0 fL    Platelet Count 233 164 - 446 K/uL    MPV 10.0 9.0 - 12.9 fL    Neutrophils-Polys 95.00 (H) 44.00 - 72.00 %    Lymphocytes 3.00 (L) 22.00 - 41.00 %     Monocytes 1.00 0.00 - 13.40 %    Eosinophils 0.00 0.00 - 6.90 %    Basophils 0.00 0.00 - 1.80 %    Nucleated RBC 0.00 0.00 - 0.20 /100 WBC    Neutrophils (Absolute) 41.95 (H) 1.82 - 7.42 K/uL    Lymphs (Absolute) 1.31 1.00 - 4.80 K/uL    Monos (Absolute) 0.44 0.00 - 0.85 K/uL    Eos (Absolute) 0.00 0.00 - 0.51 K/uL    Baso (Absolute) 0.00 0.00 - 0.12 K/uL    NRBC (Absolute) 0.00 K/uL    Anisocytosis 1+     Macrocytosis 1+    Complete Metabolic Panel   Result Value Ref Range    Sodium 139 135 - 145 mmol/L    Potassium 4.5 3.6 - 5.5 mmol/L    Chloride 102 96 - 112 mmol/L    Co2 22 20 - 33 mmol/L    Anion Gap 15.0 7.0 - 16.0    Glucose 119 (H) 65 - 99 mg/dL    Bun 27 (H) 8 - 22 mg/dL    Creatinine 0.43 (L) 0.50 - 1.40 mg/dL    Calcium 9.4 8.4 - 10.2 mg/dL    Correct Calcium 9.5 8.5 - 10.5 mg/dL    AST(SGOT) 39 12 - 45 U/L    ALT(SGPT) 28 2 - 50 U/L    Alkaline Phosphatase 163 (H) 30 - 99 U/L    Total Bilirubin 0.9 0.1 - 1.5 mg/dL    Albumin 3.9 3.2 - 4.9 g/dL    Total Protein 6.5 6.0 - 8.2 g/dL    Globulin 2.6 1.9 - 3.5 g/dL    A-G Ratio 1.5 g/dL   Urinalysis    Specimen: Urine   Result Value Ref Range    Color Yellow     Character Hazy (A)     Specific Gravity 1.025 <1.035    Ph 5.5 5.0 - 8.0    Glucose Negative Negative mg/dL    Ketones Negative Negative mg/dL    Protein Negative Negative mg/dL    Bilirubin Negative Negative    Nitrite Negative Negative    Leukocyte Esterase Negative Negative    Occult Blood Negative Negative    Micro Urine Req Microscopic    Troponins NOW   Result Value Ref Range    Troponin T 48 (H) 6 - 19 ng/L   Troponins in two (2) hours   Result Value Ref Range    Troponin T 46 (H) 6 - 19 ng/L   CoV-2, Flu A/B, And RSV by PCR ([employer])    Specimen: Nasopharyngeal; Respirate   Result Value Ref Range    Influenza virus A RNA Negative Negative    Influenza virus B, PCR Negative Negative    RSV, PCR Negative Negative    SARS-CoV-2 by PCR NotDetected     SARS-CoV-2 Source NP Swab    ESTIMATED GFR    Result Value Ref Range    GFR (CKD-EPI) 105 >60 mL/min/1.73 m 2   URINE MICROSCOPIC (W/UA)   Result Value Ref Range    WBC 0-2 /hpf    RBC 0-2 /hpf    Bacteria Few (A) None /hpf    Epithelial Cells Few Few /hpf    Amorphous Crystal Present /hpf   DIFFERENTIAL MANUAL   Result Value Ref Range    Bands-Stabs 1.00 0.00 - 10.00 %    Manual Diff Status PERFORMED    PLATELET ESTIMATE   Result Value Ref Range    Plt Estimation Normal    MORPHOLOGY   Result Value Ref Range    RBC Morphology Present     Large Platelets 1+     Polychromia 1+     Ovalocytes 1+     Toxic Vacuoles Few    proBrain Natriuretic Peptide, NT   Result Value Ref Range    NT-proBNP 7267 (H) 0 - 125 pg/mL   EKG   Result Value Ref Range    Report       [facility] Emergency Dept.    Test Date:  2024  Pt Name:    LOU ANGEL                Department: [facility]  MRN:        [id number]                      Room:  Gender:     Female                       Technician: [id number]  :        1955                   Requested By:ER TRIAGE PROTOCOL  Order #:    [id number]                    Reading MD: AYDIN WEBB DO    Measurements  Intervals                                Axis  Rate:       111                          P:          40  VT:         141                          QRS:        -38  QRSD:       95                           T:          -82  QT:         363  QTc:        494    Interpretive Statements  Sinus tachycardia  Inferior infarct, age indeterminate  Compared to ECG 2021 09:58:09  Myocardial infarct finding now present  Sinus rhythm no longer present  Electronically Signed On 2024 15:37:56 PDT by AYDIN WEBB DO       RADIOLOGY  I have independently interpreted the diagnostic imaging associated with this visit and am waiting the final reading from the radiologist.   My preliminary interpretation is as follows: increased perihilar vascular markings   Radiologist interpretation:   EC-ECHOCARDIOGRAM COMPLETE  W/O CONT   Final Result      CT-CTA CHEST PULMONARY ARTERY W/ RECONS   Final Result      Acute bilateral pulmonary emboli with possible RV strain.      These findings were electronically conveyed to and received by AYDIN WEBB on 3/18/2024 5:10 PM.         DX-CHEST-PORTABLE (1 VIEW)   Final Result         1. No acute cardiopulmonary abnormalities are identified.          COURSE & MEDICAL DECISION MAKING    ED Observation Status? No; Patient does not meet criteria for ED Observation.     INITIAL ASSESSMENT, COURSE AND PLAN  Care Narrative:     This is a pleasant 68-year-old female.  She presents via private vehicle.  She is accompanied by her daughter.  She is here with shortness of breath that started on Saturday after her fourth round of chemotherapy for which she is undergoing treatment for breast cancer.  No personal history of thromboembolic disease.  She is not anticoagulated.  She is tachycardic, tachypneic.  She is on 2 L of nasal cannula oxygen.  This was placed in triage and turned down and she did not precipitously drop.  Given her history of cancer, abnormal vital signs, I have explained to her her risk of pulmonary embolism for which we should pursue CTA and she is agreeable.  Otherwise, patient trigger sepsis and this protocol has been initiated including a troponin and EKG.  She does have new T wave inversion laterally noted on her EKG.    4:03 PM notified by nursing staff, the patient's temperature is elevated 100.5.  She was replaced on supplemental oxygen as her oxygen decreased to 87%.  Patient reports a prior history of hives with CT scanning and has required pretreatment for contrast which will be initiated here.  Patient was also treated with antipyretics.    Notified by nursing staff that lab called with critical values including white blood radha count of 43, plat count 233 and h/h 9/27    5:28 PM patient has evidence of bilateral pulmonary emboli with heart strain.   I have ordered  anticoagulation and antibiotics secondary to her elevated white blood cell count which may be related to medication, Suspect Neupogen, that she gets after chemotherapy to raise her white blood cell counts but this is quite elevated and given her fever and immune compromised condition, I think antibiotics are appropriate.  Blood cultures have already been drawn.     5:32 PM Discussed results with family. Will need to be admitted.  Await echocardiogram.    5:57 PM discussed with the hospitalist who was able to talk with our ICU physician, here at .  They are recommending echocardiogram stat which has already been ordered.  In addition to a BNP which is pending.  Question of whether patient will need transfer for further care.    I was able to speak with cardiology, Dr. Hinojosa who agrees with plan for stat echocardiogram and fortunately, the tech was able to do this prior to departing for the evening.     6:59PM D/W ICU here at , echo shows Right heart strain, and overall, worsening findings from prior. I  will contact  [facility] for transfer. She spoke with IR at [facility] Dr. Louie, who agrees patient is at risk for decompensation.   She already has previous reduced EF on echocardiogram and she would be a candidate for EKOS.    7:18 PM discussed with the transfer center and the ICU physician on-call at [facility],  Dr. Sexton regarding the patient's presentation, workup here in the emergency department including PE study, vital signs, patient condition and echocardiogram preliminary results.  He feels the patient is a candidate for IMCU,  await bed assignment.    7:23 PM discussed with Dr. Hinojosa who was able to review the patient's echocardiogram on a stat basis.  Overall, worse, when compared to previous evaluation.  He is made aware that the patient is transferring to [facility] for further intervention and he agrees with this plan.    8:24 PM patient is reevaluated at the bedside.  She has some questions which were  addressed.  She understands, she is being transferred for an urgent procedure.  LARA arrives to the bedside upon our discussion.    The total critical care time on this patient is 120 minutes, resuscitating patient, speaking with admitting physician, and deciphering test results. This 120 minutes is exclusive of separately billable procedures.      DISPOSITION AND DISCUSSIONS  I have discussed management of the patient with the following physicians and HEATHER's: Radiologist, intensivist both at this facility as well as the intensivist at [facility]    Discussion of management with other [facility] or appropriate source(s):  [facility]      Escalation of care considered, and ultimately not performed: None    Barriers to care at this time, including but not limited to: None.     FINAL DIAGNOSIS  1. Multiple subsegmental pulmonary emboli without acute cor pulmonale (HCC)    2. Malignant neoplasm of female breast, unspecified estrogen receptor status, unspecified laterality, unspecified site of breast (HCC)      Critical care time: 120 minutes    Electronically signed by: Gina Ley D.O., 3/18/2024 3:24 PM

## 2024-03-18 NOTE — ED TRIAGE NOTES
"Chief Complaint   Patient presents with    Shortness of Breath     Denies cold sx. Reports SOB since Saturday, worse today. Reports also \"my bones were aching across my chest\". Pt. Is on chemo for breast CA. Denies known mets.      Physical Exam  Pulmonary:      Effort: Pulmonary effort is normal. Tachypnea present.   Skin:     General: Skin is warm and dry.   Neurological:      Mental Status: She is alert.         "

## 2024-03-19 PROBLEM — D64.81 ANEMIA DUE TO CHEMOTHERAPY: Status: ACTIVE | Noted: 2024-03-19

## 2024-03-19 PROBLEM — C50.919 MALIGNANT NEOPLASM OF FEMALE BREAST (HCC): Status: ACTIVE | Noted: 2024-03-19

## 2024-03-19 PROBLEM — I50.810 RIGHT VENTRICULAR FAILURE (HCC): Status: ACTIVE | Noted: 2024-03-19

## 2024-03-19 PROBLEM — T45.1X5A ANEMIA DUE TO CHEMOTHERAPY: Status: ACTIVE | Noted: 2024-03-19

## 2024-03-19 PROBLEM — J96.01 ACUTE RESPIRATORY FAILURE WITH HYPOXIA (HCC): Status: ACTIVE | Noted: 2024-03-19

## 2024-03-19 PROBLEM — I07.1 SEVERE TRICUSPID REGURGITATION: Status: ACTIVE | Noted: 2024-03-19

## 2024-03-19 LAB
ABO + RH BLD: NORMAL
ABO GROUP BLD: NORMAL
ANION GAP SERPL CALC-SCNC: 10 MMOL/L (ref 7–16)
ANION GAP SERPL CALC-SCNC: 12 MMOL/L (ref 7–16)
ANISOCYTOSIS BLD QL SMEAR: ABNORMAL
ANISOCYTOSIS BLD QL SMEAR: ABNORMAL
APTT PPP: 35.9 SEC (ref 24.7–36)
APTT PPP: 38 SEC (ref 24.7–36)
APTT PPP: 43.1 SEC (ref 24.7–36)
BASOPHILS # BLD AUTO: 0 % (ref 0–1.8)
BASOPHILS # BLD AUTO: 0 % (ref 0–1.8)
BASOPHILS # BLD: 0 K/UL (ref 0–0.12)
BASOPHILS # BLD: 0 K/UL (ref 0–0.12)
BLD GP AB SCN SERPL QL: NORMAL
BUN SERPL-MCNC: 20 MG/DL (ref 8–22)
BUN SERPL-MCNC: 21 MG/DL (ref 8–22)
CALCIUM SERPL-MCNC: 8.9 MG/DL (ref 8.5–10.5)
CALCIUM SERPL-MCNC: 9.3 MG/DL (ref 8.5–10.5)
CHLORIDE SERPL-SCNC: 105 MMOL/L (ref 96–112)
CHLORIDE SERPL-SCNC: 106 MMOL/L (ref 96–112)
CO2 SERPL-SCNC: 20 MMOL/L (ref 20–33)
CO2 SERPL-SCNC: 21 MMOL/L (ref 20–33)
CREAT SERPL-MCNC: 0.32 MG/DL (ref 0.5–1.4)
CREAT SERPL-MCNC: 0.37 MG/DL (ref 0.5–1.4)
DACRYOCYTES BLD QL SMEAR: NORMAL
EOSINOPHIL # BLD AUTO: 0 K/UL (ref 0–0.51)
EOSINOPHIL # BLD AUTO: 0 K/UL (ref 0–0.51)
EOSINOPHIL NFR BLD: 0 % (ref 0–6.9)
EOSINOPHIL NFR BLD: 0 % (ref 0–6.9)
ERYTHROCYTE [DISTWIDTH] IN BLOOD BY AUTOMATED COUNT: 50.3 FL (ref 35.9–50)
ERYTHROCYTE [DISTWIDTH] IN BLOOD BY AUTOMATED COUNT: 51.7 FL (ref 35.9–50)
FIBRINOGEN PPP-MCNC: 356 MG/DL (ref 215–460)
FIBRINOGEN PPP-MCNC: 369 MG/DL (ref 215–460)
GFR SERPLBLD CREATININE-BSD FMLA CKD-EPI: 109 ML/MIN/1.73 M 2
GFR SERPLBLD CREATININE-BSD FMLA CKD-EPI: 113 ML/MIN/1.73 M 2
GLUCOSE SERPL-MCNC: 143 MG/DL (ref 65–99)
GLUCOSE SERPL-MCNC: 157 MG/DL (ref 65–99)
HCT VFR BLD AUTO: 25.5 % (ref 37–47)
HCT VFR BLD AUTO: 28.2 % (ref 37–47)
HGB BLD-MCNC: 8.8 G/DL (ref 12–16)
HGB BLD-MCNC: 9.6 G/DL (ref 12–16)
INR PPP: 1.18 (ref 0.87–1.13)
LYMPHOCYTES # BLD AUTO: 0 K/UL (ref 1–4.8)
LYMPHOCYTES # BLD AUTO: 0 K/UL (ref 1–4.8)
LYMPHOCYTES NFR BLD: 0 % (ref 22–41)
LYMPHOCYTES NFR BLD: 0 % (ref 22–41)
MANUAL DIFF BLD: NORMAL
MANUAL DIFF BLD: NORMAL
MCH RBC QN AUTO: 33.1 PG (ref 27–33)
MCH RBC QN AUTO: 33.3 PG (ref 27–33)
MCHC RBC AUTO-ENTMCNC: 34 G/DL (ref 32.2–35.5)
MCHC RBC AUTO-ENTMCNC: 34.5 G/DL (ref 32.2–35.5)
MCV RBC AUTO: 96.6 FL (ref 81.4–97.8)
MCV RBC AUTO: 97.2 FL (ref 81.4–97.8)
MICROCYTES BLD QL SMEAR: ABNORMAL
MICROCYTES BLD QL SMEAR: ABNORMAL
MONOCYTES # BLD AUTO: 0 K/UL (ref 0–0.85)
MONOCYTES # BLD AUTO: 0 K/UL (ref 0–0.85)
MONOCYTES NFR BLD AUTO: 0 % (ref 0–13.4)
MONOCYTES NFR BLD AUTO: 0 % (ref 0–13.4)
MORPHOLOGY BLD-IMP: NORMAL
MORPHOLOGY BLD-IMP: NORMAL
NEUTROPHILS # BLD AUTO: 24.4 K/UL (ref 1.82–7.42)
NEUTROPHILS # BLD AUTO: 32.2 K/UL (ref 1.82–7.42)
NEUTROPHILS NFR BLD: 100 % (ref 44–72)
NEUTROPHILS NFR BLD: 100 % (ref 44–72)
NRBC # BLD AUTO: 0 K/UL
NRBC # BLD AUTO: 0 K/UL
NRBC BLD-RTO: 0 /100 WBC (ref 0–0.2)
NRBC BLD-RTO: 0 /100 WBC (ref 0–0.2)
OVALOCYTES BLD QL SMEAR: NORMAL
OVALOCYTES BLD QL SMEAR: NORMAL
PLATELET # BLD AUTO: 184 K/UL (ref 164–446)
PLATELET BLD QL SMEAR: NORMAL
PLATELET BLD QL SMEAR: NORMAL
PMV BLD AUTO: 9.8 FL (ref 9–12.9)
POIKILOCYTOSIS BLD QL SMEAR: NORMAL
POIKILOCYTOSIS BLD QL SMEAR: NORMAL
POTASSIUM SERPL-SCNC: 4.4 MMOL/L (ref 3.6–5.5)
POTASSIUM SERPL-SCNC: 4.7 MMOL/L (ref 3.6–5.5)
PROTHROMBIN TIME: 15.1 SEC (ref 12–14.6)
RBC # BLD AUTO: 2.64 M/UL (ref 4.2–5.4)
RBC # BLD AUTO: 2.9 M/UL (ref 4.2–5.4)
RBC BLD AUTO: PRESENT
RBC BLD AUTO: PRESENT
RH BLD: NORMAL
SODIUM SERPL-SCNC: 136 MMOL/L (ref 135–145)
SODIUM SERPL-SCNC: 138 MMOL/L (ref 135–145)
UFH PPP CHRO-ACNC: 0.52 IU/ML
WBC # BLD AUTO: 24.4 K/UL (ref 4.8–10.8)
WBC # BLD AUTO: 32.2 K/UL (ref 4.8–10.8)

## 2024-03-19 PROCEDURE — 85048 AUTOMATED LEUKOCYTE COUNT: CPT

## 2024-03-19 PROCEDURE — 700102 HCHG RX REV CODE 250 W/ 637 OVERRIDE(OP): Performed by: STUDENT IN AN ORGANIZED HEALTH CARE EDUCATION/TRAINING PROGRAM

## 2024-03-19 PROCEDURE — 85007 BL SMEAR W/DIFF WBC COUNT: CPT

## 2024-03-19 PROCEDURE — 85041 AUTOMATED RBC COUNT: CPT

## 2024-03-19 PROCEDURE — 36415 COLL VENOUS BLD VENIPUNCTURE: CPT

## 2024-03-19 PROCEDURE — 700102 HCHG RX REV CODE 250 W/ 637 OVERRIDE(OP): Performed by: HOSPITALIST

## 2024-03-19 PROCEDURE — 80048 BASIC METABOLIC PNL TOTAL CA: CPT

## 2024-03-19 PROCEDURE — 85730 THROMBOPLASTIN TIME PARTIAL: CPT | Mod: 91

## 2024-03-19 PROCEDURE — 85384 FIBRINOGEN ACTIVITY: CPT

## 2024-03-19 PROCEDURE — 770020 HCHG ROOM/CARE - TELE (206)

## 2024-03-19 PROCEDURE — A9270 NON-COVERED ITEM OR SERVICE: HCPCS | Performed by: STUDENT IN AN ORGANIZED HEALTH CARE EDUCATION/TRAINING PROGRAM

## 2024-03-19 PROCEDURE — 85018 HEMOGLOBIN: CPT

## 2024-03-19 PROCEDURE — 99232 SBSQ HOSP IP/OBS MODERATE 35: CPT | Performed by: HOSPITALIST

## 2024-03-19 PROCEDURE — A9270 NON-COVERED ITEM OR SERVICE: HCPCS | Performed by: HOSPITALIST

## 2024-03-19 PROCEDURE — 700111 HCHG RX REV CODE 636 W/ 250 OVERRIDE (IP): Performed by: STUDENT IN AN ORGANIZED HEALTH CARE EDUCATION/TRAINING PROGRAM

## 2024-03-19 PROCEDURE — 85014 HEMATOCRIT: CPT

## 2024-03-19 RX ORDER — HEPARIN SODIUM 5000 [USP'U]/100ML
0-30 INJECTION, SOLUTION INTRAVENOUS CONTINUOUS
Status: DISCONTINUED | OUTPATIENT
Start: 2024-03-19 | End: 2024-03-19

## 2024-03-19 RX ORDER — LOSARTAN POTASSIUM 50 MG/1
100 TABLET ORAL DAILY
Status: DISCONTINUED | OUTPATIENT
Start: 2024-03-19 | End: 2024-03-19

## 2024-03-19 RX ORDER — LOSARTAN POTASSIUM 50 MG/1
100 TABLET ORAL DAILY
Status: DISCONTINUED | OUTPATIENT
Start: 2024-03-19 | End: 2024-03-20 | Stop reason: HOSPADM

## 2024-03-19 RX ORDER — ONDANSETRON 4 MG/1
4 TABLET, ORALLY DISINTEGRATING ORAL EVERY 6 HOURS PRN
Status: DISCONTINUED | OUTPATIENT
Start: 2024-03-19 | End: 2024-03-20 | Stop reason: HOSPADM

## 2024-03-19 RX ORDER — LABETALOL HYDROCHLORIDE 5 MG/ML
10 INJECTION, SOLUTION INTRAVENOUS EVERY 4 HOURS PRN
Status: DISCONTINUED | OUTPATIENT
Start: 2024-03-19 | End: 2024-03-20 | Stop reason: HOSPADM

## 2024-03-19 RX ORDER — ACETAMINOPHEN 325 MG/1
650 TABLET ORAL EVERY 6 HOURS PRN
Status: DISCONTINUED | OUTPATIENT
Start: 2024-03-19 | End: 2024-03-20 | Stop reason: HOSPADM

## 2024-03-19 RX ORDER — OLANZAPINE 5 MG/1
5 TABLET ORAL DAILY
Status: DISCONTINUED | OUTPATIENT
Start: 2024-03-19 | End: 2024-03-20 | Stop reason: HOSPADM

## 2024-03-19 RX ADMIN — LOSARTAN POTASSIUM 100 MG: 50 TABLET, FILM COATED ORAL at 01:09

## 2024-03-19 RX ADMIN — APIXABAN 10 MG: 5 TABLET, FILM COATED ORAL at 09:39

## 2024-03-19 RX ADMIN — OLANZAPINE 5 MG: 5 TABLET, FILM COATED ORAL at 05:56

## 2024-03-19 RX ADMIN — HEPARIN SODIUM 18 UNITS/KG/HR: 5000 INJECTION, SOLUTION INTRAVENOUS at 04:50

## 2024-03-19 RX ADMIN — APIXABAN 10 MG: 5 TABLET, FILM COATED ORAL at 18:18

## 2024-03-19 RX ADMIN — LABETALOL HYDROCHLORIDE 10 MG: 5 INJECTION, SOLUTION INTRAVENOUS at 04:09

## 2024-03-19 ASSESSMENT — ENCOUNTER SYMPTOMS
MYALGIAS: 0
SENSORY CHANGE: 0
SHORTNESS OF BREATH: 1
FOCAL WEAKNESS: 0
NAUSEA: 0
FEVER: 0
SHORTNESS OF BREATH: 0
DIARRHEA: 0
PSYCHIATRIC NEGATIVE: 1
VOMITING: 0
DIZZINESS: 0
ABDOMINAL PAIN: 0
COUGH: 0
CHILLS: 0

## 2024-03-19 ASSESSMENT — PAIN DESCRIPTION - PAIN TYPE: TYPE: ACUTE PAIN

## 2024-03-19 ASSESSMENT — FIBROSIS 4 INDEX: FIB4 SCORE: 2.72

## 2024-03-19 NOTE — PROGRESS NOTES
Medication history reviewed with pt. Med rec is complete.  Allergies reviewed, per pt  Interviewed pt with daughter at bedside with permission from pt.     Pt reports that she takes her OLANZAPINE 5MG in the morning.     Pt receives her chemo treatment from [facility] ([phone]) every 14 days. Last treatment was on 3/14/2024.     Patient has not had any outpatient antibiotics in the last 30 days.     Pt is not on any anticoagulants

## 2024-03-19 NOTE — ASSESSMENT & PLAN NOTE
Severe TR seen on echocardiogram.  No prior echo to compare, unknown baseline  She does have bilateral acute pulmonary Resulting in right ventricular failure  Monitor volume status, outpatient follow-up

## 2024-03-19 NOTE — PROGRESS NOTES
Pt arrived to unit via wheelchair at 1030. Pt oriented to room, unit, and plan of care. Tele-monitor placed and monitor room notified. All questions answered at this time. Call light within reach; fall precautions in place.

## 2024-03-19 NOTE — PROGRESS NOTES
Ekos ended at 0309    Therapy time for A 4 hours 3 minutes  Therapy time for B 3 hours 59 minutes    Sheath and cath removed 0310, pressure held for 30 minutes. Bleeding stop. Hematoma noted, MD notified.

## 2024-03-19 NOTE — ASSESSMENT & PLAN NOTE
Echocardiogram shows severe TR, reduced RV systolic function.  Unknown baseline, likely secondary to bilateral pulmonary emboli  Status post EKOS  Follow-up with echocardiogram as outpatient

## 2024-03-19 NOTE — DOCUMENTATION QUERY
[facility]                                                                       Query Response Note      PATIENT:               LOU ANGEL  ACCT #:                  [id number]  MRN:                     [id number]  :                      1955  ADMIT DATE:       3/18/2024 8:49 PM  DISCH DATE:          RESPONDING  PROVIDER #:        [id number]           QUERY TEXT:    Pulmonary embolism (PE) is documented in the Medical Record. Please specify type or etiology and whether with or without acute cor pulmonale    The patient's Clinical Indicators include:  Findings: 3/18 HP: bilateral pulmonary emboli completed her fourth round of chemo on 3/14 and has been  feeling weakness/fatigue and shortness of breath since then. CT of the chest was done which showed bilateral pulmonary emboli and right heart strain.  An echocardiogram was done which showed an EF of 65%, severe TR, moderately dilated right ventricle with reduced RV systolic function.       Treatment: EKOS heparin drip     Risk Factors: bilateral pulmonary embolism currently on chemo    Joyce Nieto RN BSN  Clinical Documentation   [email or web address]  Connect via [email or web address] Messenger  Options provided:   -- Acute pulmonary embolism with acute cor pulmonale   -- Acute pulmonary embolism without acute cor pulmonale   -- Other explanation, (please specify other explanation)      Query created by: Joyce Trammell on 3/19/2024 3:40 AM    RESPONSE TEXT:    Acute pulmonary embolism with acute cor pulmonale          Electronically signed by:  CHON LAMB MD 3/19/2024 4:06 AM

## 2024-03-19 NOTE — H&P
Hospital Medicine History & Physical Note    Date of Service  3/18/2024    Primary Care Physician  Gerber Em M.D.    Consultants  Intensivist, IR    Code Status  Full Code    Chief Complaint  Shortness of breath    History of Presenting Illness  Haylee Jean-Baptiste is a 68 y.o. female who transferred from [facility] 3/18/2024 with bilateral pulmonary emboli.  PMH of breast cancer currently undergoing chemotherapy, hypertension.  She completed her fourth round of chemo on 3/14 and has been feeling weakness/fatigue and shortness of breath since then.  Denies fever/chills, no chest pain.    As of now she was afebrile, hemodynamically stable, was requiring 2 L O2.  Labs showed a WBC count of 43.7, hemoglobin 9.5, troponin 48 trended down to 46, BNP 7000.  CT of the chest was done which showed bilateral pulmonary emboli and right heart strain.  An echocardiogram was done which showed an EF of 65%, severe TR, moderately dilated right ventricle with reduced RV systolic function.    EDP discussed with intensivist who recommended transfer for EKOS.  She successfully underwent EKOS by Dr. Louie and is now in the IMCU.    I discussed the plan of care with patient, family, and bedside RN.    Review of Systems  Review of Systems   Constitutional:  Positive for malaise/fatigue. Negative for chills and fever.   Respiratory:  Positive for shortness of breath. Negative for cough.    Cardiovascular:  Negative for chest pain.   Gastrointestinal:  Negative for abdominal pain, diarrhea, nausea and vomiting.   Genitourinary:  Negative for dysuria and urgency.       Past Medical History   has a past medical history of Allergy, Anxiety (08/28/2020), Cancer (HCC), Headache(784.0), Heart burn (02/18/2021), Hypertension, and Migraine.    Surgical History   has a past surgical history that includes abdominal hysterectomy total; tonsillectomy; wrist fusion; open reduction; lipoma excision; and vitrectomy posterior (Right, 4/27/2021).      Family History  family history includes Cancer in her brother; Dementia (age of onset: 70) in her father; Diabetes in her brother, mother, and sister; Hypertension in her brother, mother, and sister; Lung Disease in her paternal uncle; Stroke in her mother.   Family history reviewed with patient. There is no family history that is pertinent to the chief complaint.     Social History   reports that she has never smoked. She has never used smokeless tobacco. She reports that she does not drink alcohol and does not use drugs.    Allergies  Allergies   Allergen Reactions    Iodine      hives    Pcn [Penicillins] Hives       Medications  Prior to Admission Medications   Prescriptions Last Dose Informant Patient Reported? Taking?   ALPRAZolam (XANAX) 0.25 MG Tab  Patient Yes No   Sig: Take 0.25 mg by mouth 3 times a day as needed for Anxiety.   Cranberry-Vitamin C (AZO CRANBERRY URINARY TRACT PO)  Patient Yes No   Sig: Take 2 Capsules by mouth 2 times a day as needed (For urinary pain). (OTC)   Multiple Vitamins-Minerals (CENTRUM ADULT PO)  Patient Yes No   Sig: Take 1 Tablet by mouth every day.   OLANZapine (ZYPREXA) 5 MG Tab  Patient Yes No   Sig: Take 5 mg by mouth every day.   acetaminophen (TYLENOL) 500 MG Tab  Patient Yes No   Sig: Take 1,000 mg by mouth every 6 hours as needed. Indications: Pain   lidocaine-prilocaine (EMLA) 2.5-2.5 % Cream  Patient Yes No   Sig: Apply 1 Application topically as needed. Indications: Anesthesia to a Specific Part of the Body   losartan (COZAAR) 100 MG Tab  Patient Yes No   Sig: Take 100 mg by mouth every day.   ondansetron (ZOFRAN ODT) 4 MG TABLET DISPERSIBLE  Patient Yes No   Sig: Take 4 mg by mouth every 6 hours as needed for Nausea/Vomiting. Indications: Nausea and Vomiting      Facility-Administered Medications: None       Physical Exam  Temp:  [36.6 °C (97.9 °F)-38.1 °C (100.5 °F)] 36.6 °C (97.9 °F)  Pulse:  [] 89  Resp:  [14-22] 17  BP: (118-175)/(74-98)  143/84  SpO2:  [87 %-97 %] 95 %  Blood Pressure : (!) 175/90   Temperature: 36.6 °C (97.9 °F)   Pulse: 93   Respiration: 16   Pulse Oximetry: 93 %       Physical Exam  Constitutional:       Appearance: She is obese.   HENT:      Head: Normocephalic and atraumatic.      Mouth/Throat:      Mouth: Mucous membranes are moist.      Pharynx: No oropharyngeal exudate or posterior oropharyngeal erythema.   Cardiovascular:      Rate and Rhythm: Normal rate and regular rhythm.      Pulses: Normal pulses.      Heart sounds: Normal heart sounds. No murmur heard.  Pulmonary:      Effort: Pulmonary effort is normal. No respiratory distress.      Breath sounds: Normal breath sounds.   Musculoskeletal:         General: No swelling or tenderness. Normal range of motion.   Skin:     General: Skin is warm and dry.   Neurological:      General: No focal deficit present.      Mental Status: She is alert and oriented to person, place, and time.   Psychiatric:         Mood and Affect: Mood normal.         Laboratory:  Recent Labs     03/18/24  1545 03/18/24 2315   WBC 43.7* 32.2*   RBC 2.80* 2.90*   HEMOGLOBIN 9.5* 9.6*   HEMATOCRIT 27.7* 28.2*   MCV 98.9* 97.2   MCH 33.9* 33.1*   MCHC 34.3 34.0   RDW 51.1* 51.7*   PLATELETCT 233 184   MPV 10.0 9.8     Recent Labs     03/18/24  1545 03/18/24  2315   SODIUM 139 138   POTASSIUM 4.5 4.7   CHLORIDE 102 105   CO2 22 21   GLUCOSE 119* 157*   BUN 27* 21   CREATININE 0.43* 0.37*   CALCIUM 9.4 9.3     Recent Labs     03/18/24  1545 03/18/24  2315   ALTSGPT 28  --    ASTSGOT 39  --    ALKPHOSPHAT 163*  --    TBILIRUBIN 0.9  --    GLUCOSE 119* 157*     Recent Labs     03/18/24  2315   APTT 43.1*   INR 1.18*     Recent Labs     03/18/24  1545   NTPROBNP 7267*         Recent Labs     03/18/24  1545 03/18/24  1719   TROPONINT 48* 46*       Imaging:  IR-PULMONARY ANGIOGRAM-BILATERAL    (Results Pending)   IR-CONSULT AND TREAT    (Results Pending)       X-Ray:  I have personally reviewed the images and  compared with prior images. and My impression is: no acute  EKG:  I have personally reviewed the images and compared with prior images. and My impression is: Sinus tachycardia    Assessment/Plan:  Justification for Admission Status  I anticipate this patient will require at least two midnights for appropriate medical management, necessitating inpatient admission because bilateral pulmonary embolism    * Bilateral pulmonary embolism (HCC)- (present on admission)  Assessment & Plan  CTA chest from [facility] reviewed, bilateral pulmonary embolism, history of breast cancer currently undergoing chemotherapy  S/p EKOS and currently in the [facility]  Heparin drip ordered    Right ventricular failure (HCC)- (present on admission)  Assessment & Plan  Echocardiogram shows severe TR, reduced RV systolic function.  Unknown baseline, likely secondary to bilateral pulmonary emboli  I/O, monitor volume status closely  Follow-up with echocardiogram as outpatient    Severe tricuspid regurgitation- (present on admission)  Assessment & Plan  Severe TR seen on echocardiogram.  No prior echo to compare, unknown baseline  She does have bilateral acute pulmonary Resulting in right ventricular failure  Monitor volume status, outpatient follow-up    Anemia due to chemotherapy- (present on admission)  Assessment & Plan  Hemoglobin 9.6 on presentation, around recent baseline.  CBC ordered to continue monitoring    Malignant neoplasm of female breast (HCC)- (present on admission)  Assessment & Plan  Currently undergoing chemotherapy most recently on 3/14    Essential hypertension- (present on admission)  Assessment & Plan  Continue losartan    Patient is critically ill.   The patient continues to have: Bilateral pulmonary embolism s/p EKOS  If untreated there is a high chance of deterioration And eventually death.   The critical care that I am providing today is: Patient has bilateral pulmonary emboli, s/p EKOS successfully.  On continuing  patient on heparin drip, needs close monitoring for any signs of bleeding close monitoring of hemodynamic status due to RV failure and severe TR.  The critical that has been undertaken is medically complex.   There has been no overlap in critical care time.   Critical Care Time not including procedures: 52 minutes      VTE prophylaxis: pharmacologic prophylaxis contraindicated due to heparin

## 2024-03-19 NOTE — OR SURGEON
Immediate Post- Operative Note        Findings: Bilateral PE      Procedure(s): Bilateral Pulmonary Angiograms and EKOS placement      Estimated Blood Loss: Less than 5 ml        Complications: None            3/18/2024     10:22 PM     Samuel Louie M.D.

## 2024-03-19 NOTE — ED NOTES
Yumiko from Lab called with critical result of WBC at 43.7, hemoglobin at 9.5, hematocrit at 27.7 and platelets at 233 . Critical lab result read back to yumiko.   Dr. sharp notified of critical lab result at 1704.  Critical lab result read back by Dr. sharp.

## 2024-03-19 NOTE — CONSULTS
[facility] DIRECT ADMISSION REPORT    Transferring facility: [facility] ER  Transferring physician: Dr. Chu    Chief complaint: SOB  Pertinent history & patient course:   69yo female with hx of breast CA, getting chemotherapy who presented to ED after had 2 day hx of SOB. At ED, found [address] bilateral PE with RV strain RV/LV ratio 1.4. Trop mildly elevated 0.46.   HR in 100s, SBP in 150s. Pt alert, oriented. 2L NC  COVID/flu/RSV negative  Did have low grade fever for which blood cultures were obtained. Lactate normal. Pulmonary  was consulted for PE  Pt has TTE in 1/2025 with normal RV size/function and bedside with dilated RV with reduced RV function. Pulmonary also discussed with IR regarding need of CDT and was recommended EKOS.   Leonel score 4, stage II  Pt was given lovenox therapeutic dose at [facility]. Plan for EKOS tomorrow am. Can go to [facility]    Pertinent imaging & lab results: as above  Consultants called prior to transfer and pertinent input from consultants: Critical care  Code Status: not asked  Reason for Transfer: need EKOS  Further work up or recommendations requested prior to transfer: none    Patient accepted for transfer: Yes  Accepting [facility] Facility: [facility] - Nursing to notify the Triage Coordinator in the RTOC via Voalte or Phone ext. [phone] when patient arrives to the unit. The Triage Coordinator will assign the admitting provider.    Consultants to be called upon arrival: Hospital MEdicine  Admission status: Inpatient.   Floor requested: [facility]    The admitting provider is the point of contact for questions or concerns regarding patient's care.

## 2024-03-19 NOTE — PROGRESS NOTES
[facility] Medicine Daily Progress Note    Date of Service  3/19/2024    Chief Complaint  Haylee Jean-Baptiste is a 68 y.o. female admitted 3/18/2024 with chest pain and shortness of breath.    Hospital Course  Ms. Jean-Baptiste is a 68 y.o. female who transferred from [facility] 3/18/2024 with bilateral pulmonary emboli.  PMH of breast cancer currently undergoing chemotherapy, hypertension.  She completed her fourth round of chemo on 3/14 and has been feeling weakness/fatigue and shortness of breath since then.  Denies fever/chills, no chest pain.  She is requiring 2 L O2.  Labs showed a WBC count of 43.7, hemoglobin 9.5, troponin 48 trended down to 46, BNP 7000.  CT of the chest was done which showed bilateral pulmonary emboli and right heart strain.  An echocardiogram was done which showed an EF of 65%, severe TR, moderately dilated right ventricle with reduced RV systolic function.     EDP discussed with intensivist who recommended transfer for EKOS.  She successfully underwent EKOS by Dr. Louie with admission to the IMCU.    Interval Problem Update  3/19: Ms. Jean-Baptiste was evaluated and examined in the IMCU. She is on an IV heparin drip. Her sister is at bedside. She is on nasal cannula oxygen. Eliquis will be started. We discussed indefinite anticoagulation.     I have discussed this patient's plan of care and discharge plan at IDT rounds today with Case Management, Nursing, Nursing leadership, and other members of the IDT team.    Consultants/Specialty  IR    Code Status  Full Code    Disposition  The patient is not medically cleared for discharge to home or a post-acute facility.  Anticipate discharge to: home with close outpatient follow-up    I have placed the appropriate orders for post-discharge needs.    Review of Systems  Review of Systems   Respiratory:  Negative for shortness of breath.    Cardiovascular:  Negative for chest pain and leg swelling.   All other systems reviewed and are negative.       Physical Exam  Temp:   [36.6 °C (97.9 °F)-38.1 °C (100.5 °F)] 36.9 °C (98.5 °F)  Pulse:  [] 83  Resp:  [14-22] 17  BP: (118-184)/(68-98) 135/68  SpO2:  [87 %-98 %] 97 %    Physical Exam  Vitals and nursing note reviewed.   Constitutional:       General: She is not in acute distress.     Appearance: She is not toxic-appearing.   Cardiovascular:      Rate and Rhythm: Normal rate and regular rhythm.      Comments: Right chest port  Pulmonary:      Effort: Pulmonary effort is normal.      Breath sounds: Normal breath sounds.      Comments: Nasal cannula oxygen  Abdominal:      General: There is no distension.      Tenderness: There is no abdominal tenderness.   Musculoskeletal:      Right lower leg: No edema.      Left lower leg: No edema.   Skin:     General: Skin is warm and dry.   Neurological:      General: No focal deficit present.      Mental Status: She is alert and oriented to person, place, and time.   Psychiatric:         Mood and Affect: Mood normal.         Behavior: Behavior normal.         Thought Content: Thought content normal.         Judgment: Judgment normal.         Fluids    Intake/Output Summary (Last 24 hours) at 3/19/2024 0702  Last data filed at 3/19/2024 0500  Gross per 24 hour   Intake 200 ml   Output 700 ml   Net -500 ml       Laboratory  Recent Labs     03/18/24  1545 03/18/24 2315 03/19/24  0445   WBC 43.7* 32.2* 24.4*   RBC 2.80* 2.90* 2.64*   HEMOGLOBIN 9.5* 9.6* 8.8*   HEMATOCRIT 27.7* 28.2* 25.5*   MCV 98.9* 97.2 96.6   MCH 33.9* 33.1* 33.3*   MCHC 34.3 34.0 34.5   RDW 51.1* 51.7* 50.3*   PLATELETCT 233 184  --    MPV 10.0 9.8  --      Recent Labs     03/18/24  1545 03/18/24 2315 03/19/24  0445   SODIUM 139 138 136   POTASSIUM 4.5 4.7 4.4   CHLORIDE 102 105 106   CO2 22 21 20   GLUCOSE 119* 157* 143*   BUN 27* 21 20   CREATININE 0.43* 0.37* 0.32*   CALCIUM 9.4 9.3 8.9     Recent Labs     03/18/24  2315 03/19/24  0340 03/19/24  0445   APTT 43.1* 35.9 38.0*   INR 1.18*  --   --                 Imaging  IR-PULMONARY ANGIOGRAM-BILATERAL    (Results Pending)        Assessment/Plan  * Bilateral pulmonary embolism (HCC)- (present on admission)  Assessment & Plan  CTA chest from [facility] reviewed, bilateral pulmonary embolism, history of breast cancer currently undergoing chemotherapy  S/p EKOS and currently in the [facility]  Heparin drip transition to Eliquis and monitor for bleeding.  Oxygen as indicated  PT/OT evals  Continuous pulse oxymetry     Malignant neoplasm of female breast (HCC)- (present on admission)  Assessment & Plan  Currently undergoing chemotherapy most recently on 3/14  Followed by Dr. Shaikh oncology and Dr. Jean-Baptiste breast surgeon    Acute respiratory failure with hypoxia (HCC)- (present on admission)  Assessment & Plan  Secondary to pulmonary embolism  Supplemental oxygen.     Severe tricuspid regurgitation- (present on admission)  Assessment & Plan  Severe TR seen on echocardiogram.  No prior echo to compare, unknown baseline  She does have bilateral acute pulmonary Resulting in right ventricular failure  Monitor volume status, outpatient follow-up    Right ventricular failure (HCC)- (present on admission)  Assessment & Plan  Echocardiogram shows severe TR, reduced RV systolic function.  Unknown baseline, likely secondary to bilateral pulmonary emboli  Status post EKOS  Follow-up with echocardiogram as outpatient    Anemia due to chemotherapy- (present on admission)  Assessment & Plan  Hemoglobin 9.6 on presentation, around recent baseline.  Repeat Hb 8.8  Monitor for bleeding.    Essential hypertension- (present on admission)  Assessment & Plan  Continue losartan         VTE prophylaxis:    therapeutic anticoagulation with eliquis 5 mg BID      I have performed a physical exam and reviewed and updated ROS and Plan today (3/19/2024). In review of yesterday's note (3/18/2024), there are no changes except as documented above.

## 2024-03-19 NOTE — CONSULTS
Critical Care Consultation    Date of consult: 3/18/2024    Referring Physician  Gina Ley D.O.    Reason for Consultation  PE    History of Presenting Illness  68 y.o. female with history of breast cancer currently undergoing chemotherapy who presented 3/18/2024 with weakness after last (4th) chemotherapy session and dyspnea. Patient has been more sedentary recently due to her weakness from chemo.   In ER, she was initially tachy to low 100s with normal BP and on 2L O2. Trop was elevated at 48 and BNP 7267.  CTPA with b/l PE with RV:LV ratio 1:4.     Code Status  No Order    Review of Systems  Review of Systems   Constitutional:  Positive for malaise/fatigue.   Respiratory:  Positive for shortness of breath.    Cardiovascular:  Negative for chest pain.   Gastrointestinal:  Positive for nausea. Negative for heartburn and vomiting.   Musculoskeletal:  Negative for falls.   Neurological:  Positive for weakness. Negative for focal weakness.       Past Medical History   has a past medical history of Allergy, Anxiety (08/28/2020), Cancer ([facility]), Headache(784.0), Heart burn (02/18/2021), Hypertension, and Migraine.    Surgical History   has a past surgical history that includes abdominal hysterectomy total; tonsillectomy; wrist fusion; open reduction; lipoma excision; and vitrectomy posterior (Right, 4/27/2021).    Family History  family history includes Cancer in her brother; Dementia (age of onset: 70) in her father; Diabetes in her brother, mother, and sister; Hypertension in her brother, mother, and sister; Lung Disease in her paternal uncle; Stroke in her mother.    Social History   reports that she has never smoked. She has never used smokeless tobacco. She reports that she does not drink alcohol and does not use drugs.    Medications  Home Medications       Reviewed by Blanca England (Pharmacy Tech) on 03/18/24 at 1600  Med List Status: Complete     Medication Last Dose Status   acetaminophen (TYLENOL)  500 MG Tab 3/16/2024 Active   ALPRAZolam (XANAX) 0.25 MG Tab > 2 weeks Active   Cranberry-Vitamin C (AZO CRANBERRY URINARY TRACT PO) > 1 week Active   lidocaine-prilocaine (EMLA) 2.5-2.5 % Cream 3/14/2024 Active   losartan (COZAAR) 100 MG Tab 3/17/2024 Active   Multiple Vitamins-Minerals (CENTRUM ADULT PO) 3/18/2024 Active   OLANZapine (ZYPREXA) 5 MG Tab 3/18/2024 Active   ondansetron (ZOFRAN ODT) 4 MG TABLET DISPERSIBLE > 2 weeks Active                  No current facility-administered medications for this encounter.     Current Outpatient Medications   Medication Sig Dispense Refill    acetaminophen (TYLENOL) 500 MG Tab Take 1,000 mg by mouth every 6 hours as needed. Indications: Pain      Multiple Vitamins-Minerals (CENTRUM ADULT PO) Take 1 Tablet by mouth every day.      Cranberry-Vitamin C (AZO CRANBERRY URINARY TRACT PO) Take 2 Capsules by mouth 2 times a day as needed (For urinary pain). (OTC)      OLANZapine (ZYPREXA) 5 MG Tab Take 5 mg by mouth every day.      ondansetron (ZOFRAN ODT) 4 MG TABLET DISPERSIBLE Take 4 mg by mouth every 6 hours as needed for Nausea/Vomiting. Indications: Nausea and Vomiting      lidocaine-prilocaine (EMLA) 2.5-2.5 % Cream Apply 1 Application topically as needed. Indications: Anesthesia to a Specific Part of the Body      losartan (COZAAR) 100 MG Tab Take 100 mg by mouth every day.      ALPRAZolam (XANAX) 0.25 MG Tab Take 0.25 mg by mouth 3 times a day as needed for Anxiety.         Allergies  Allergies   Allergen Reactions    Iodine      hives    Pcn [Penicillins] Hives       Vital Signs last 24 hours  Temp:  [36.7 °C (98.1 °F)-38.1 °C (100.5 °F)] 36.7 °C (98.1 °F)  Pulse:  [] 91  Resp:  [16-22] 18  BP: (118-150)/(79-87) 150/87  SpO2:  [87 %-97 %] 95 %    Physical Exam  Physical Exam  Vitals and nursing note reviewed.   HENT:      Head: Normocephalic.      Mouth/Throat:      Mouth: Mucous membranes are moist.   Eyes:      Conjunctiva/sclera: Conjunctivae normal.    Cardiovascular:      Rate and Rhythm: Normal rate and regular rhythm.   Pulmonary:      Effort: Pulmonary effort is normal. No respiratory distress.   Abdominal:      Palpations: Abdomen is soft.   Skin:     General: Skin is warm.   Neurological:      Mental Status: She is alert. Mental status is at baseline.   Psychiatric:         Mood and Affect: Mood normal.         Fluids  No intake or output data in the 24 hours ending 24 1909    Laboratory  Recent Results (from the past 48 hour(s))   EKG    Collection Time: 24  2:54 PM   Result Value Ref Range    Report       [facility] Emergency Dept.    Test Date:  2024  Pt Name:    LOU ANGEL                Department: E[facility]  MRN:        [id number]                      Room:  Gender:     Female                       Technician: [id number]  :        [id number]                   Requested By:ER TRIAGE PROTOCOL  Order #:    [id number]                    Reading MD: AYDIN WEBB DO    Measurements  Intervals                                Axis  Rate:       111                          P:          40  SC:         141                          QRS:        -38  QRSD:       95                           T:          -82  QT:         363  QTc:        494    Interpretive Statements  Sinus tachycardia  Inferior infarct, age indeterminate  Compared to ECG 2021 09:58:09  Myocardial infarct finding now present  Sinus rhythm no longer present  Electronically Signed On 2024 15:37:56 PDT by AYDIN WEBB DO     Lactic Acid    Collection Time: 24  3:45 PM   Result Value Ref Range    Lactic Acid 1.4 0.5 - 2.0 mmol/L   CBC with Differential    Collection Time: 24  3:45 PM   Result Value Ref Range    WBC 43.7 (H) 4.8 - 10.8 K/uL    RBC 2.80 (L) 4.20 - 5.40 M/uL    Hemoglobin 9.5 (L) 12.0 - 16.0 g/dL    Hematocrit 27.7 (L) 37.0 - 47.0 %    MCV 98.9 (H) 81.4 - 97.8 fL    MCH 33.9 (H) 27.0 - 33.0 pg    MCHC 34.3 32.2 - 35.5 g/dL     RDW 51.1 (H) 35.9 - 50.0 fL    Platelet Count 233 164 - 446 K/uL    MPV 10.0 9.0 - 12.9 fL    Neutrophils-Polys 95.00 (H) 44.00 - 72.00 %    Lymphocytes 3.00 (L) 22.00 - 41.00 %    Monocytes 1.00 0.00 - 13.40 %    Eosinophils 0.00 0.00 - 6.90 %    Basophils 0.00 0.00 - 1.80 %    Nucleated RBC 0.00 0.00 - 0.20 /100 WBC    Neutrophils (Absolute) 41.95 (H) 1.82 - 7.42 K/uL    Lymphs (Absolute) 1.31 1.00 - 4.80 K/uL    Monos (Absolute) 0.44 0.00 - 0.85 K/uL    Eos (Absolute) 0.00 0.00 - 0.51 K/uL    Baso (Absolute) 0.00 0.00 - 0.12 K/uL    NRBC (Absolute) 0.00 K/uL    Anisocytosis 1+     Macrocytosis 1+    Complete Metabolic Panel    Collection Time: 03/18/24  3:45 PM   Result Value Ref Range    Sodium 139 135 - 145 mmol/L    Potassium 4.5 3.6 - 5.5 mmol/L    Chloride 102 96 - 112 mmol/L    Co2 22 20 - 33 mmol/L    Anion Gap 15.0 7.0 - 16.0    Glucose 119 (H) 65 - 99 mg/dL    Bun 27 (H) 8 - 22 mg/dL    Creatinine 0.43 (L) 0.50 - 1.40 mg/dL    Calcium 9.4 8.4 - 10.2 mg/dL    Correct Calcium 9.5 8.5 - 10.5 mg/dL    AST(SGOT) 39 12 - 45 U/L    ALT(SGPT) 28 2 - 50 U/L    Alkaline Phosphatase 163 (H) 30 - 99 U/L    Total Bilirubin 0.9 0.1 - 1.5 mg/dL    Albumin 3.9 3.2 - 4.9 g/dL    Total Protein 6.5 6.0 - 8.2 g/dL    Globulin 2.6 1.9 - 3.5 g/dL    A-G Ratio 1.5 g/dL   Troponins NOW    Collection Time: 03/18/24  3:45 PM   Result Value Ref Range    Troponin T 48 (H) 6 - 19 ng/L   ESTIMATED GFR    Collection Time: 03/18/24  3:45 PM   Result Value Ref Range    GFR (CKD-EPI) 105 >60 mL/min/1.73 m 2   DIFFERENTIAL MANUAL    Collection Time: 03/18/24  3:45 PM   Result Value Ref Range    Bands-Stabs 1.00 0.00 - 10.00 %    Manual Diff Status PERFORMED    PLATELET ESTIMATE    Collection Time: 03/18/24  3:45 PM   Result Value Ref Range    Plt Estimation Normal    MORPHOLOGY    Collection Time: 03/18/24  3:45 PM   Result Value Ref Range    RBC Morphology Present     Large Platelets 1+     Polychromia 1+     Ovalocytes 1+     Toxic  Vacuoles Few    proBrain Natriuretic Peptide, NT    Collection Time: 03/18/24  3:45 PM   Result Value Ref Range    NT-proBNP 7267 (H) 0 - 125 pg/mL   CoV-2, Flu A/B, And RSV by PCR (C3Nano)    Collection Time: 03/18/24  3:59 PM    Specimen: Nasopharyngeal; Respirate   Result Value Ref Range    Influenza virus A RNA Negative Negative    Influenza virus B, PCR Negative Negative    RSV, PCR Negative Negative    SARS-CoV-2 by PCR NotDetected     SARS-CoV-2 Source NP Swab    Urinalysis    Collection Time: 03/18/24  4:15 PM    Specimen: Urine   Result Value Ref Range    Color Yellow     Character Hazy (A)     Specific Gravity 1.025 <1.035    Ph 5.5 5.0 - 8.0    Glucose Negative Negative mg/dL    Ketones Negative Negative mg/dL    Protein Negative Negative mg/dL    Bilirubin Negative Negative    Nitrite Negative Negative    Leukocyte Esterase Negative Negative    Occult Blood Negative Negative    Micro Urine Req Microscopic    URINE MICROSCOPIC (W/UA)    Collection Time: 03/18/24  4:15 PM   Result Value Ref Range    WBC 0-2 /hpf    RBC 0-2 /hpf    Bacteria Few (A) None /hpf    Epithelial Cells Few Few /hpf    Amorphous Crystal Present /hpf   Troponins in two (2) hours    Collection Time: 03/18/24  5:19 PM   Result Value Ref Range    Troponin T 46 (H) 6 - 19 ng/L       Imaging  Reviewed     Assessment/Plan    Bilateral pulmonary emboli  Patient falls in the intermediate high risk category with sPESI >1, +cardiac markers and RV strain, no hemodynamic instability. NOAH score 4 pts, stage II.  Patient's TTE in 1/2024 shows RVSP 56 and normal RV size and function.  Bedside TTE now with dilated RV and reduced RV function with RVSP in 90s - pending final read by cardiology.  While patient falls in the intermediate-intermediate high risk category, I'm worried that the acute insult with her already elevated underlying RV pressures/PH puts her at higher risk for decompensation and complications. I discussed risks vs benefits of AC  alone vs CDT + AC with patient and she was agreeable with CDT.     - f/u final TTE read  - I discussed case with Dr. Louie, IR, who agrees with above and recommends EKOS. Pt only ate grapes around 11am and nothing since then - NPO  - I discussed above with ERP - patient will be transferred to [facility] for CDT  - AC - adjust accordingly       Agnes Lazcano MD  Pulmonary and Critical Care Medicine  [facility]

## 2024-03-19 NOTE — PROGRESS NOTES
4 Eyes Skin Assessment Completed by CECILIO Ty and NIKKY Moore.    Head WDL  Ears WDL  Nose WDL  Mouth WDL  Neck WDL  Breast/Chest WDL  Shoulder Blades WDL  Spine WDL  (R) Arm/Elbow/Hand WDL  (L) Arm/Elbow/Hand WDL  Abdomen WDL  Groin Bruising and Incision  Scrotum/Coccyx/Buttocks WDL  (R) Leg WDL  (L) Leg WDL  (R) Heel/Foot/Toe WDL  (L) Heel/Foot/Toe WDL          Devices In Places Tele Box and Pulse Ox      Interventions In Place Pillows and Pressure Redistribution Mattress    Possible Skin Injury No    Pictures Uploaded Into Epic N/A  Wound Consult Placed N/A  RN Wound Prevention Protocol Ordered No

## 2024-03-19 NOTE — DIETARY
NUTRITION SERVICES: BMI - Pt with BMI >40 (=Body mass index is 44.24 kg/m².), Class III obesity. Weight loss counseling not appropriate in acute care setting. RECOMMEND - If appropriate at DC please refer to outpatient nutrition services for weight management.

## 2024-03-19 NOTE — ASSESSMENT & PLAN NOTE
CTA chest from [facility] reviewed, bilateral pulmonary embolism, history of breast cancer currently undergoing chemotherapy  S/p EKOS and currently in the IMCU  Heparin drip transition to Eliquis and monitor for bleeding.  Oxygen as indicated  PT/OT evals  Continuous pulse oxymetry

## 2024-03-19 NOTE — PROGRESS NOTES
Dr. Louie notified of R fem groin site bleeding. Okay to re enforce dressing and continue plan of care. Continue to monitor for increased bleeding. When EKOS is finished hold manual pressure for no less then 20 minutes.

## 2024-03-19 NOTE — CONSULTS
Brief intensivist note.     D/w Dr. Louie after EKOS. Pt underwent EKOS successfully.   Hemodynamically stable  On 2L NC, HR in 90s, SBP in 150s.   After discussing with charge RN in ICU/IMCU, pt can be managed in IMCU   Hosp Medicine to admit  Critical care will sign off, and please call with questions    Carrillo Sexton D.O.

## 2024-03-19 NOTE — PROGRESS NOTES
Pt presents to IR 2. Pt. was consented by MD at bedside, confirmed by this RN and consent at bedside. Pt transferred to procedure table in supine position. Patient underwent a PE [employer]ytics Infusion by Dr. Louie. Procedure site was marked by MD and verified using imaging guidance. Pt placed on monitor, prepped and draped in a sterile fashion. Vitals were taken every 5 minutes and remained stable during procedure (see doc flow sheet for results). CO2 waveform capnography was monitored and remained WNL throughout procedure. Report called to CECILIO Adams. Pt transported by stretcher with RN to T632.

## 2024-03-19 NOTE — PROGRESS NOTES
4 Eyes Skin Assessment Completed by CECILIO Adams and CECILIO Jones.    Head WDL  Ears WDL  Nose WDL  Mouth WDL  Neck WDL  Breast/Chest Bruising  Shoulder Blades WDL  Spine WDL  (R) Arm/Elbow/Hand WDL  (L) Arm/Elbow/Hand WDL  Abdomen WDL  Groin- single side access 2 sheath, R fem vein with dressing intact  Scrotum/Coccyx/Buttocks Redness and Blanching  (R) Leg WDL  (L) Leg WDL  (R) Heel/Foot/Toe Blanching  (L) Heel/Foot/Toe Blanching          Devices In Places ECG, Blood Pressure Cuff, Pulse Ox, and Nasal Cannula      Interventions In Place Gray Ear Foams, Pillows, and Low Air Loss Mattress    Possible Skin Injury No    Pictures Uploaded Into Epic N/A  Wound Consult Placed N/A  RN Wound Prevention Protocol Ordered No

## 2024-03-19 NOTE — ASSESSMENT & PLAN NOTE
Currently undergoing chemotherapy most recently on 3/14  Followed by Dr. Shaikh oncology and Dr. Jean-Baptiste breast surgeon

## 2024-03-19 NOTE — PROGRESS NOTES
Radiology Progress Note     Author: Riri Sharpe D.N.P. Date & Time created: 3/19/2024  1:50 PM   Date of admission  3/18/2024  Note to reader: this note follows the APSO format rather than the historical SOAP format. Assessment and plan located at the top of the note for ease of use.    Chief Complaint  68 y.o. female admitted 3/18/2024 with chest pain and shortness of breath.    HPI  Haylee Jean-Baptiste is a 67 yo female with PMH significant for breast cancer currently undergoing chemotherapy, HTN who was admitted from [facility] for weakness/fatigue and shortness of breath since last chemotherapy (3/14). 3/18/24 CT demonstrated acute bilateral pulmonary emboli with possible RV strain and RV/LV ratio of 1.4. 3/18/24 IR Dr Louie completed bilateral pulmonary angiograms with EKOS placement.     Interval History IR  3/19/24: EKOS sheath and catheter removed @ 0310, pressure held for 30 minutes with bleeding controlled and small hematoma noted. Pt transitioned to IV heparin infusion. Pt is SEGOVIA to PPTE, SpO2 97% on 2 lpm per NC. Right groin site, mildly tender without palp hematoma, ecchymosis, or bleeding. Dressing reinforced with tape and gauze prior to mobilization. Dorsalis pedis pulses +2 bilaterally, feet pink, warm, dry, Lower extremity sensation and movement grossly intact. I reviewed patient's most recent labs, including WBC 24.4<32.2, Hgb 8.8<9.6, Cr 0.32< 0.37. Coordinated post IR procedure care with hospitalist and hospital nursing staff.    Assessment/Plan     Principal Problem:    Bilateral pulmonary embolism (HCC)  Active Problems:    Essential hypertension    Malignant neoplasm of female breast (HCC)    Anemia due to chemotherapy    Right ventricular failure (HCC)    Severe tricuspid regurgitation    Acute respiratory failure with hypoxia (HCC)      Plan IR  -  Post IR groin access site instructions: no lifting greater than 5 lbs and no baths/swimming/soaking in tub for 5 days. Shower OK. OK to change  dressings/band aid as needed.  - Interventional Radiology signing off.    -Thank you for allowing Interventional Radiology team to participate in the patients care, if any additional care or requests are needed in the future please do not hesitate call or place IR order   [phone]                 Review of Systems  Physical Exam   Review of Systems   Constitutional:  Negative for chills and fever.   Respiratory:  Negative for cough and shortness of breath.    Cardiovascular:  Positive for leg swelling. Negative for chest pain.   Gastrointestinal:  Negative for abdominal pain, nausea and vomiting.   Genitourinary:  Negative for dysuria.   Musculoskeletal:  Negative for myalgias.   Neurological:  Negative for dizziness, sensory change and focal weakness.   Psychiatric/Behavioral: Negative.        Vitals:    03/19/24 1208   BP: 115/63   Pulse: 81   Resp: 16   Temp: 36.5 °C (97.7 °F)   SpO2: 95%        Physical Exam  Vitals and nursing note reviewed.   Constitutional:       Appearance: Normal appearance.   Cardiovascular:      Rate and Rhythm: Normal rate.      Pulses: Normal pulses.   Pulmonary:      Effort: Pulmonary effort is normal. No respiratory distress.   Abdominal:      General: There is no distension.      Palpations: Abdomen is soft.      Tenderness: There is no abdominal tenderness. There is no guarding.   Musculoskeletal:      Right lower leg: Edema present.      Left lower leg: Edema present.      Comments: RIGHT groin access site soft, non-tender, without ecchymosis; dressing cdi.     Skin:     Capillary Refill: Capillary refill takes 2 to 3 seconds.   Neurological:      General: No focal deficit present.      Mental Status: She is alert and oriented to person, place, and time.      Sensory: No sensory deficit.   Psychiatric:         Mood and Affect: Mood normal.         Behavior: Behavior normal.             Labs    Recent Labs     03/18/24  1545 03/18/24  2315 03/19/24  0445   WBC 43.7* 32.2* 24.4*  "  RBC 2.80* 2.90* 2.64*   HEMOGLOBIN 9.5* 9.6* 8.8*   HEMATOCRIT 27.7* 28.2* 25.5*   MCV 98.9* 97.2 96.6   MCH 33.9* 33.1* 33.3*   MCHC 34.3 34.0 34.5   RDW 51.1* 51.7* 50.3*   PLATELETCT 233 184  --    MPV 10.0 9.8  --      Recent Labs     03/18/24  1545 03/18/24 2315 03/19/24  0445   SODIUM 139 138 136   POTASSIUM 4.5 4.7 4.4   CHLORIDE 102 105 106   CO2 22 21 20   GLUCOSE 119* 157* 143*   BUN 27* 21 20   CREATININE 0.43* 0.37* 0.32*   CALCIUM 9.4 9.3 8.9     Recent Labs     03/18/24  1545 03/18/24 2315 03/19/24  0445   ALBUMIN 3.9  --   --    TBILIRUBIN 0.9  --   --    ALKPHOSPHAT 163*  --   --    TOTPROTEIN 6.5  --   --    ALTSGPT 28  --   --    ASTSGOT 39  --   --    CREATININE 0.43* 0.37* 0.32*     IR-PULMONARY ANGIOGRAM-BILATERAL    (Results Pending)       INR   Date Value Ref Range Status   03/18/2024 1.18 (H) 0.87 - 1.13 Final     Comment:     INR - Non-therapeutic Reference Range: 0.87-1.13  INR - Therapeutic Reference Range: 2.0-4.0       No results found for: \"POCINR\"     Intake/Output Summary (Last 24 hours) at 3/19/2024 1350  Last data filed at 3/19/2024 0940  Gross per 24 hour   Intake 336.7 ml   Output 700 ml   Net -363.3 ml      Labs not explicitly included in this progress note were reviewed by the author. Radiology/imaging not explicitly included in this progress note was reviewed by the author.     I have performed a physical exam and reviewed and updated ROS and Plan today (3/19/2024).     56 minutes in directly providing and coordinating care and extensive data review.  No time overlap and excludes procedures.  "

## 2024-03-20 ENCOUNTER — PHARMACY VISIT (OUTPATIENT)
Dept: PHARMACY | Facility: MEDICAL CENTER | Age: 69
End: 2024-03-20
Payer: COMMERCIAL

## 2024-03-20 VITALS
RESPIRATION RATE: 18 BRPM | WEIGHT: 246.25 LBS | SYSTOLIC BLOOD PRESSURE: 144 MMHG | BODY MASS INDEX: 42.04 KG/M2 | DIASTOLIC BLOOD PRESSURE: 83 MMHG | HEART RATE: 96 BPM | TEMPERATURE: 98.1 F | HEIGHT: 64 IN | OXYGEN SATURATION: 93 %

## 2024-03-20 PROCEDURE — 97166 OT EVAL MOD COMPLEX 45 MIN: CPT

## 2024-03-20 PROCEDURE — 97162 PT EVAL MOD COMPLEX 30 MIN: CPT

## 2024-03-20 PROCEDURE — 700102 HCHG RX REV CODE 250 W/ 637 OVERRIDE(OP): Performed by: HOSPITALIST

## 2024-03-20 PROCEDURE — 97535 SELF CARE MNGMENT TRAINING: CPT

## 2024-03-20 PROCEDURE — A9270 NON-COVERED ITEM OR SERVICE: HCPCS | Performed by: HOSPITALIST

## 2024-03-20 PROCEDURE — 99239 HOSP IP/OBS DSCHRG MGMT >30: CPT | Performed by: HOSPITALIST

## 2024-03-20 PROCEDURE — RXMED WILLOW AMBULATORY MEDICATION CHARGE: Performed by: HOSPITALIST

## 2024-03-20 RX ADMIN — APIXABAN 10 MG: 5 TABLET, FILM COATED ORAL at 04:58

## 2024-03-20 ASSESSMENT — PAIN DESCRIPTION - PAIN TYPE: TYPE: ACUTE PAIN

## 2024-03-20 ASSESSMENT — COGNITIVE AND FUNCTIONAL STATUS - GENERAL
DAILY ACTIVITIY SCORE: 24
SUGGESTED CMS G CODE MODIFIER MOBILITY: CH
MOBILITY SCORE: 24
SUGGESTED CMS G CODE MODIFIER DAILY ACTIVITY: CH

## 2024-03-20 ASSESSMENT — GAIT ASSESSMENTS
DEVIATION: BRADYKINETIC;SHUFFLED GAIT
DISTANCE (FEET): 150
GAIT LEVEL OF ASSIST: SUPERVISED

## 2024-03-20 ASSESSMENT — FIBROSIS 4 INDEX: FIB4 SCORE: 2.72

## 2024-03-20 ASSESSMENT — ACTIVITIES OF DAILY LIVING (ADL): TOILETING: INDEPENDENT

## 2024-03-20 NOTE — PROGRESS NOTES
Bedside report received from off going RN/tech: CECILIO Hinton, assumed care of patient.     Fall Risk Score: LOW RISK  Fall risk interventions in place: Provide patient/family education based on risk assessment and Place patient in room close to nursing station  Bed type: Regular (Vazquez Score less than 17 interventions in place)  Patient on cardiac monitor: Yes  IVF/IV medications: Not Applicable   Oxygen: Room Air  Bedside sitter: Not Applicable   Isolation: Not applicable

## 2024-03-20 NOTE — ED NOTES
"ED Positive Culture Follow-up/Notification Note:    Date: 3/20/24     Patient seen in the ED on 3/18/2024 for evaluation of shortness of breath. Per ED provider note, \"She got her fourth round of chemotherapy this past Thursday. She started feeling achy afterwards. She states by Saturday, she had achiness which she describes \"in her bones\" across her chest and in her shoulders bilaterally. This progressively worsened. To the point where she has had to make multiple attempts just to get her laundry into her house. Ultimately she was unsuccessful and it still sits in her garage. She typically drives herself to [place] where she receives her chemotherapy treatment and stays there. She lives in [place]. Her daughter is at the bedside and states that the symptoms are very abnormal for her mother. She is not oxygen dependent. She is still working and managing her own activities of daily living and her current condition is markedly changed. She denies a cough or fever. She has struggled with nausea and vomiting for which she has been prescribed an antiemetic. She denies having pain except the achiness across her chest. She states she is gradually lost weight. No diarrhea. No urinary complaints. She has a history of hypertension. She is not anticoagulated. No history of diabetes. Dr. Vivar is her oncologist. She has a history of breast cancer.\"  1. Multiple subsegmental pulmonary emboli without acute cor pulmonale (HCC)    2. Malignant neoplasm of female breast, unspecified estrogen receptor status, unspecified laterality, unspecified site of breast (HCC)       Discharge Medication List as of 3/18/2024  8:38 PM          Allergies: Iodine and Pcn [penicillins]     Vitals:    03/18/24 1603 03/18/24 1726 03/18/24 1858 03/18/24 1928   BP:  118/79 (!) 150/87 133/74   Pulse: (!) 107 92 91 88   Resp:  16 18 (!) 22   Temp:  36.7 °C (98.1 °F)     TempSrc:  Temporal     SpO2: 96% 97% 95% 95%   Weight:       Height:           Final " cultures:   Results       Procedure Component Value Units Date/Time    Blood Culture - Draw one from central line and one from peripheral site [[id number]]  (Abnormal) Collected: 03/18/24 1545    Order Status: Completed Specimen: Blood from Line Updated: 03/19/24 2141     Significant Indicator POS     Source BLD     Site Peripheral     Culture Result Growth detected by Bactec instrument. 03/19/2024  21:40  Gram Stain: Gram positive cocci: Possible Staphylococcus sp.      Narrative:      CALL  [facility]  [facility] tel. [phone],  CALLED  EDSM tel. [phone] 03/19/2024, 21:40, RB PERF. RESULTS CALLED  TO:[phone]  Blood Cultures X2. Blood Cultures X2. Draw one blood culture  from central line and one blood culture peripherally. If no  line present, draw blood cultures times two peripherally from  different sites.  Right Forearm/Arm    Urine Culture (New) [[id number]] Collected: 03/18/24 1615    Order Status: Sent Specimen: Urine Updated: 03/19/24 1243    Narrative:      Indication for culture:->Emergency Room Patient    Blood Culture - Draw one from central line and one from peripheral site [[id number]] Collected: 03/18/24 1545    Order Status: Completed Specimen: Blood from Peripheral Updated: 03/19/24 0728     Significant Indicator NEG     Source BLD     Site PERIPHERAL     Culture Result No Growth  Note: Blood cultures are incubated for 5 days and  are monitored continuously.Positive blood cultures  are called to the RN and reported as soon as  they are identified.  Blood culture testing and Gram stain, if indicated, are  performed at [facility], [address] [place] [address].  Positive blood cultures are  sent to [facility], [address] [place] [address], for organism identification and  susceptibility testing.      Narrative:      Blood Cultures X2. Draw one blood culture from central line  and one blood culture peripherally. If no line present, draw  blood cultures  times two peripherally from different sites.  Left AC    CoV-2, Flu A/B, And RSV by PCR ([facility]) [[id number]] Collected: 03/18/24 1559    Order Status: Completed Specimen: Respirate from Nasopharyngeal Updated: 03/18/24 1647     Influenza virus A RNA Negative     Influenza virus B, PCR Negative     RSV, PCR Negative     SARS-CoV-2 by PCR NotDetected     Comment: [facility] providers: PLEASE REFER TO DE-ESCALATION AND RETESTING PROTOCOL  on [email or web address]    **The [facility] GeneXpert Xpress SARS-CoV-2 RT-PCR Test has been made  available for use under the Emergency Use Authorization (EUA) only.          SARS-CoV-2 Source NP Swab    Urinalysis [[id number]]  (Abnormal) Collected: 03/18/24 1615    Order Status: Completed Specimen: Urine Updated: 03/18/24 1645     Color Yellow     Character Hazy     Specific Gravity 1.025     Ph 5.5     Glucose Negative mg/dL      Ketones Negative mg/dL      Protein Negative mg/dL      Bilirubin Negative     Nitrite Negative     Leukocyte Esterase Negative     Occult Blood Negative     Micro Urine Req Microscopic    Narrative:      Indication for culture:->Emergency Room Patient            Plan:   Blood culture result positive in 1 of 2 blood cultures for Gram-positive cocci that is possibly a Staphylococcus species.  Patient admit for a pulmonary embolism and is not being treated for a suspected contaminated blood culture. No action required.    Kehinde Gibson, PharmD

## 2024-03-20 NOTE — DISCHARGE SUMMARY
Discharge Summary    CHIEF COMPLAINT ON ADMISSION  No chief complaint on file.      Reason for Admission  pulmonary embolism     Admission Date  3/18/2024    CODE STATUS  Full Code    HPI & HOSPITAL COURSE  This is a 68 y.o. female here with shortness of breath.   Ms. Jean-Baptiste is a 68 y.o. female who transferred from [facility] 3/18/2024 with bilateral pulmonary emboli.  PMH of breast cancer currently undergoing chemotherapy, hypertension.  She completed her fourth round of chemo on 3/14 and has been feeling weakness/fatigue and shortness of breath since then.  Denies fever/chills, no chest pain.  She is requiring 2 L O2.  Labs showed a WBC count of 43.7 (she was given a granulocyte stimulating medication the day after chemo), hemoglobin 9.5, troponin 48 trended down to 46, BNP 7000.  CT of the chest was done which showed bilateral pulmonary emboli and right heart strain.  An echocardiogram was done which showed an EF of 65%, severe TR, moderately dilated right ventricle with reduced RV systolic function.  She successfully underwent EKOS by Dr. Louie with admission to the [facility]. On 3/19 she was transitioned to oral Eliquis and the IV heparin drip was turned off.   3/20: Ms. Jean-Baptiste is feeling quite well and is anxious to go home. She is on room air and able to ambulate unassisted. She has not bleeding. We had a long discussion that she will be on anticoagulation indefinitely and we discussed the risks of being on blood thinners.     Therefore, she is discharged in good and stable condition to home with close outpatient follow-up.    The patient met 2-midnight criteria for an inpatient stay at the time of discharge.    Discharge Date  3/20    FOLLOW UP ITEMS POST DISCHARGE  She will stay with her sister for the week and will take this week off work. She has an appointment on Thursday with Dr. Shaikh her oncologist.    DISCHARGE DIAGNOSES  Principal Problem:    Bilateral pulmonary embolism (HCC) (POA: Yes)  Active  Problems:    Malignant neoplasm of female breast (HCC) (POA: Yes)    Acute respiratory failure with hypoxia (HCC) (POA: Yes)    Essential hypertension (POA: Yes)    Anemia due to chemotherapy (POA: Yes)    Right ventricular failure (HCC) (POA: Yes)    Severe tricuspid regurgitation (POA: Yes)  Resolved Problems:    * No resolved hospital problems. *      FOLLOW UP  No future appointments.  Faith Shaikh M.D.  [address]  [place] [address]  [phone]    Follow up    Outpatient echo in the next month or so to evaluate the RVSP per Dr. Em's office.     MEDICATIONS ON DISCHARGE     Medication List        START taking these medications        Instructions   apixaban 5mg Tabs  Start taking on: March 20, 2024  Commonly known as: Eliquis   Take 2 Tablets by mouth 2 times a day for 6 days, THEN 1 Tablet 2 times a day for 30 days. Indications: DVT/PE            CONTINUE taking these medications        Instructions   acetaminophen 500 MG Tabs  Commonly known as: Tylenol   Take 1,000 mg by mouth every 6 hours as needed. Indications: Pain  Dose: 1,000 mg     ALPRAZolam 0.25 MG Tabs  Commonly known as: Xanax   Take 0.25 mg by mouth 3 times a day as needed for Anxiety.  Dose: 0.25 mg     AZO CRANBERRY URINARY TRACT PO   Take 2 Capsules by mouth 2 times a day as needed (For urinary pain). (OTC)  Dose: 2 Capsule     CENTRUM ADULT PO   Take 1 Tablet by mouth every day.  Dose: 1 Tablet     lidocaine-prilocaine 2.5-2.5 % Crea  Commonly known as: Emla   Apply 1 Application topically as needed. Indications: Anesthesia to a Specific Part of the Body  Dose: 1 Application     losartan 100 MG Tabs  Commonly known as: Cozaar   Take 100 mg by mouth every day.  Dose: 100 mg     OLANZapine 5 MG Tabs  Commonly known as: ZyPREXA   Take 5 mg by mouth every day.  Dose: 5 mg     ondansetron 4 MG Tbdp  Commonly known as: Zofran ODT   Take 4 mg by mouth every 6 hours as needed for Nausea/Vomiting. Indications: Nausea and  Vomiting  Dose: 4 mg              Allergies  Allergies   Allergen Reactions    Iodine      hives    Pcn [Penicillins] Hives       DIET  Orders Placed This Encounter   Procedures    Diet Order Diet: Regular     Standing Status:   Standing     Number of Occurrences:   1     Order Specific Question:   Diet:     Answer:   Regular [1]       ACTIVITY  As tolerated.      CONSULTATIONS  none    PROCEDURES  EKOS by Dr. Louie    LABORATORY  Lab Results   Component Value Date    SODIUM 136 03/19/2024    POTASSIUM 4.4 03/19/2024    CHLORIDE 106 03/19/2024    CO2 20 03/19/2024    GLUCOSE 143 (H) 03/19/2024    BUN 20 03/19/2024    CREATININE 0.32 (L) 03/19/2024        Lab Results   Component Value Date    WBC 24.4 (H) 03/19/2024    HEMOGLOBIN 8.8 (L) 03/19/2024    HEMATOCRIT 25.5 (L) 03/19/2024    PLATELETCT 184 03/18/2024      IR-PULMONARY ANGIOGRAM-BILATERAL    (Results Pending)   Echo:  CONCLUSIONS  Prior study 01/04/2024, compared to the images of the prior study,   there has been increase of tricuspid regurgitation and elevated PASP.   Normal left ventricular systolic function.  The left ventricular ejection fraction is visually estimated to be 65%.  Mild mitral regurgitation.  Mild aortic insufficiency.  Severe tricuspid regurgitation.  Right ventricular systolic pressure is estimated to be 85 mmHg.      Total time of the discharge process exceeds 35 minutes.

## 2024-03-20 NOTE — DISCHARGE INSTRUCTIONS
Apixaban Tablets  What is this medication?  APIXABAN (a PIX a ban) prevents or treats blood clots. It is also used to lower the risk of stroke in people with AFib (atrial fibrillation). It belongs to a group of medications called blood thinners.  This medicine may be used for other purposes; ask your health care provider or pharmacist if you have questions.  COMMON BRAND NAME(S): Eliquis  What should I tell my care team before I take this medication?  They need to know if you have any of these conditions:  Antiphospholipid antibody syndrome  Bleeding disorder  History of bleeding in the brain  History of blood clots  History of stomach bleeding  Kidney disease  Liver disease  Mechanical heart valve  Spinal surgery  An unusual or allergic reaction to apixaban, other medications, foods, dyes, or preservatives  Pregnant or trying to get pregnant  Breast-feeding  How should I use this medication?  Take this medication by mouth. For your therapy to work as well as possible, take each dose exactly as prescribed on the prescription label. Do not skip doses. Skipping doses or stopping this medication can increase your risk of a blood clot or stroke. Keep taking this medication unless your care team tells you to stop. Take it as directed on the prescription label at the same time every day. You can take it with or without food. If it upsets your stomach, take it with food.  A special MedGuide will be given to you by the pharmacist with each prescription and refill. Be sure to read this information carefully each time.  Talk to your care team about the use of this medication in children. Special care may be needed.  Overdosage: If you think you have taken too much of this medicine contact a poison control center or emergency room at once.  NOTE: This medicine is only for you. Do not share this medicine with others.  What if I miss a dose?  If you miss a dose, take it as soon as you can. If it is almost time for your next  dose, take only that dose. Do not take double or extra doses.  What may interact with this medication?  This medication may interact with the following:  Aspirin and aspirin-like medications  Certain medications for fungal infections like itraconazole and ketoconazole  Certain medications for seizures like carbamazepine and phenytoin  Certain medications for blood clots like enoxaparin, dalteparin, heparin, and warfarin  Clarithromycin  NSAIDs, medications for pain and inflammation, like ibuprofen or naproxen  Rifampin  Ritonavir  Del's wort  This list may not describe all possible interactions. Give your health care provider a list of all the medicines, herbs, non-prescription drugs, or dietary supplements you use. Also tell them if you smoke, drink alcohol, or use illegal drugs. Some items may interact with your medicine.  What should I watch for while using this medication?  Visit your healthcare professional for regular checks on your progress. You may need blood work done while you are taking this medication. Your condition will be monitored carefully while you are receiving this medication. It is important not to miss any appointments.  Avoid sports and activities that might cause injury while you are using this medication. Severe falls or injuries can cause unseen bleeding. Be careful when using sharp tools or knives. Consider using an electric razor. Take special care brushing or flossing your teeth. Report any injuries, bruising, or red spots on the skin to your healthcare professional.  If you are going to need surgery or other procedure, tell your healthcare professional that you are taking this medication.  Wear a medical ID bracelet or chain. Carry a card that describes your disease and details of your medication and dosage times.  What side effects may I notice from receiving this medication?  Side effects that you should report to your care team as soon as possible:  Allergic reactions--skin  rash, itching, hives, swelling of the face, lips, tongue, or throat  Bleeding--bloody or black, tar-like stools, vomiting blood or brown material that looks like coffee grounds, red or dark brown urine, small red or purple spots on the skin, unusual bruising or bleeding  Bleeding in the brain--severe headache, stiff neck, confusion, dizziness, change in vision, numbness or weakness of the face, arm, or leg, trouble speaking, trouble walking, vomiting  Heavy periods  This list may not describe all possible side effects. Call your doctor for medical advice about side effects. You may report side effects to FDA at 0-918-VEU-1254.  Where should I keep my medication?  Keep out of the reach of children and pets.  Store at room temperature between 20 and 25 degrees C (68 and 77 degrees F). Get rid of any unused medication after the expiration date.  To get rid of medications that are no longer needed or :  Take the medication to a medication take-back program. Check with your pharmacy or law enforcement to find a location.  If you cannot return the medication, check the label or package insert to see if the medication should be thrown out in the garbage or flushed down the toilet. If you are not sure, ask your care team. If it is safe to put in the trash, empty the medication out of the container. Mix the medication with cat litter, dirt, coffee grounds, or other unwanted substance. Seal the mixture in a bag or container. Put it in the trash.  NOTE: This sheet is a summary. It may not cover all possible information. If you have questions about this medicine, talk to your doctor, pharmacist, or health care provider.  ©  Elsevier/Gold Standard (2022 00:00:00)

## 2024-03-20 NOTE — PROGRESS NOTES
Bedside report received. Patient A/Ox 4. VS SBP 's. No oxygen requirements currently. Telemetry monitoring SR. No complaints of pain at this time. POC discussed with patient. Pt verbalizes understanding. Call light and belongings within reach. Bed locked and lowest position and fall precautions in place.    Bedside report received from off going RN/tech: CECILIO Ty assumed care of patient.     Fall Risk Score: LOW RISK  Fall risk interventions in place: Provide patient/family education based on risk assessment, Educate patient/family to call staff for assistance when getting out of bed, Place fall precaution signage outside patient door, and Place patient in room close to nursing station  Bed type: Regular (Vazquez Score less than 17 interventions in place)  Patient on cardiac monitor: Yes  IVF/IV medications: Not Applicable   Oxygen: Room Air  Bedside sitter: Not Applicable   Isolation: Not applicable

## 2024-03-20 NOTE — CARE PLAN
The patient is Watcher - Medium risk of patient condition declining or worsening    Shift Goals  Clinical Goals: Monitor groin site, VSS, safety  Patient Goals: Safe discharge    Progress made toward(s) clinical / shift goals:    Problem: Knowledge Deficit - Standard  Goal: Patient and family/care givers will demonstrate understanding of plan of care, disease process/condition, diagnostic tests and medications  Outcome: Progressing  Note: POC discussed, medication education provided, all questions answered at this time.     Problem: Fall Risk  Goal: Patient will remain free from falls  Outcome: Progressing  Note: Call light and personal belongings within reach. Bed locked in the lowest position. Patient reminded to call for assistance.       Patient is not progressing towards the following goals:

## 2024-03-20 NOTE — THERAPY
Occupational Therapy   Initial Evaluation     Patient Name: Haylee Jean-Baptiste  Age:  68 y.o., Sex:  female  Medical Record #: [id number]  Today's Date: 3/20/2024     Precautions  Precautions: Fall Risk    Assessment    Patient is 68 y.o. female admitted with SOB, weakness. Found B PE, s/p EKOS. PMHx of breast cancer current completed round 4/8 of chemo, HTN, obesity. Pt normally independent with functional mobility and ADLs living in a [facility] alone, plans to stay with sister. Pt able to complete all functional mobility and ADLs with supervision, no AD required. Anticipate pt is at or near her functional baseline, will complete OT order at this time.      Plan    DC Equipment Recommendations: (P) None  Discharge Recommendations: (P) Anticipate that the patient will have no further occupational therapy needs after discharge from the hospital     Objective       03/20/24 0850   Prior Living Situation   Prior Services Home-Independent   Housing / Facility 1 Story House   Steps Into Home 1   Steps In Home 0   Rail None   Bathroom Set up Walk In Shower;Grab Bars  (has access to shower chair if needed)   Equipment Owned None   Lives with - Patient's Self Care Capacity Alone and Able to Care For Self   Prior Level of ADL Function   Self Feeding Independent   Grooming / Hygiene Independent   Bathing Independent   Dressing Independent   Toileting Independent   Prior Level of IADL Function   Medication Management Independent   Laundry Independent   Kitchen Mobility Independent   Finances Independent   Home Management Independent   Shopping Independent   Prior Level Of Mobility Independent Without Device in Community   Driving / Transportation Driving Independent   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)   History of Falls   History of Falls No   Pain 0 - 10 Group   Therapist Pain Assessment Nurse Notified;Post Activity Pain Same as Prior to Activity   Cognition    Cognition / Consciousness WDL   Level of  Consciousness Alert   Active ROM Upper Body   Active ROM Upper Body  WDL   Dominant Hand Right   Strength Upper Body   Upper Body Strength  WDL   Sensation Upper Body   Upper Extremity Sensation  WDL   Upper Body Muscle Tone   Upper Body Muscle Tone  WDL   Neurological Concerns   Neurological Concerns No   Coordination Upper Body   Coordination WDL   Balance Assessment   Sitting Balance (Static) Good   Sitting Balance (Dynamic) Good   Standing Balance (Static) Good   Standing Balance (Dynamic) Good   Weight Shift Sitting Good   Weight Shift Standing Good   Comments no AD   Bed Mobility    Supine to Sit Supervised   Scooting Supervised   Rolling Supervised   ADL Assessment   Grooming Standby Assist;Standing   Upper Body Dressing Supervision   Lower Body Dressing Supervision   How much help from another person does the patient currently need...   Putting on and taking off regular lower body clothing? 4   Bathing (including washing, rinsing, and drying)? 4   Toileting, which includes using a toilet, bedpan, or urinal? 4   Putting on and taking off regular upper body clothing? 4   Taking care of personal grooming such as brushing teeth? 4   Eating meals? 4   6 Clicks Daily Activity Score 24   Functional Mobility   Sit to Stand Supervised   Bed, Chair, Wheelchair Transfer Supervised   Transfer Method Stand Step   Mobility took over from PT, bathroom mobility, left seated in chair with RN   Comments no AD   Activity Tolerance   Standing 10 min   Education Group   Education Provided Role of Occupational Therapist   Role of Occupational Therapist Patient Response Patient;Acceptance;Explanation   Problem List   Problem List None   Anticipated Discharge Equipment and Recommendations   DC Equipment Recommendations None   Discharge Recommendations Anticipate that the patient will have no further occupational therapy needs after discharge from the hospital   Interdisciplinary Plan of Care Collaboration   IDT Collaboration with   Nursing;Physical Therapist   Patient Position at End of Therapy Seated;Call Light within Reach;Tray Table within Reach;Phone within Reach  (RN in room)   Collaboration Comments RN updated

## 2024-03-20 NOTE — THERAPY
"Physical Therapy   Initial Evaluation and Discharge     Patient Name: Haylee Jean-Baptiste  Age:  68 y.o., Sex:  female  Medical Record #: [id number]  Today's Date: 3/20/2024     Precautions  Precautions: Fall Risk    Assessment  Patient is 68 y.o. female admitted with SOB, weakness. Found B PE, s/p EKOS. PMHx of breast cancer current completed round 4/8 of chemo, HTN, obesity. Pt did not require any physical assist for all mobility including hallway ambulation, see vitals below as slight increase in HR with activity. Pt appears to be near baseline mobility, will have good support from family upon d/c and plans to take some time off from work before returning. Recommend home with no needs. Will d/c PT as pt with no further acute IP PT needs.     Plan    Physical Therapy Initial Treatment Plan   Duration: Evaluation only    DC Equipment Recommendations: None  Discharge Recommendations: Anticipate that the patient will have no further physical therapy needs after discharge from the hospital       Subjective    \"I get up for work at 4:30 every day, I'm not used to laying around.\"      Objective     03/20/24 0832   Vitals   O2 Delivery Device None - Room Air   Vitals Comments HR up to 117 with ambulation, mild SOB and slightly positive talk test. HR returned to 93 with seated rest 2 min   Pain 0 - 10 Group   Therapist Pain Assessment Post Activity Pain Same as Prior to Activity;Nurse Notified;0   Prior Living Situation   Prior Services Home-Independent   Housing / Facility 1 Story House   Steps Into Home 1   Steps In Home 0   Rail None   Bathroom Set up Walk In Shower;Grab Bars   Equipment Owned None   Lives with - Patient's Self Care Capacity Alone and Able to Care For Self   Comments Above home set up is sister's as pt will d/c to her home, and nephew lives there as well. Will have help as needed. 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   Prior Level of Functional Mobility   Bed Mobility Independent   Transfer Status " Independent   Ambulation Independent   Ambulation Distance   (community)   Assistive Devices Used None   Stairs Independent   Cognition    Cognition / Consciousness WDL   Level of Consciousness Alert   Comments Pleasant and cooperative   Strength Upper Body   Upper Body Strength  WDL   Active ROM Lower Body    Active ROM Lower Body  WDL   Strength Lower Body   Lower Body Strength  WDL   Balance Assessment   Sitting Balance (Static) Good   Sitting Balance (Dynamic) Good   Standing Balance (Static) Good   Standing Balance (Dynamic) Good   Weight Shift Sitting Good   Weight Shift Standing Good   Comments no AD   Bed Mobility    Supine to Sit Supervised   Scooting Supervised   Rolling Supervised   Gait Analysis   Gait Level Of Assist Supervised   Assistive Device None   Distance (Feet) 150   # of Times Distance was Traveled 1   Deviation Bradykinetic;Shuffled Gait   # of Stairs Climbed   (no concerns regarding 1 small step to enter home, pt demonstrating capability given LE strength and balance)   Functional Mobility   Sit to Stand Supervised   Bed, Chair, Wheelchair Transfer Supervised   Transfer Method Stand Step   Mobility no AD in hallway, up to chair   6 Clicks Assessment - How much HELP from from another person do you currently need... (If the patient hasn't done an activity recently, how much help from another person do you think he/she would need if he/she tried?)   Turning from your back to your side while in a flat bed without using bedrails? 4   Moving from lying on your back to sitting on the side of a flat bed without using bedrails? 4   Moving to and from a bed to a chair (including a wheelchair)? 4   Standing up from a chair using your arms (e.g., wheelchair, or bedside chair)? 4   Walking in hospital room? 4   Climbing 3-5 steps with a railing? 4   6 clicks Mobility Score 24   Activity Tolerance   Comments mild SOB with activity   Education Group   Additional Comments Educated on talk test, activity  pacing and progression, energy conservation techniques, self management and compensatory strategies   Physical Therapy Initial Treatment Plan    Duration Evaluation only   Problem List    Problems Decreased Activity Tolerance   Anticipated Discharge Equipment and Recommendations   DC Equipment Recommendations None   Discharge Recommendations Anticipate that the patient will have no further physical therapy needs after discharge from the hospital   Interdisciplinary Plan of Care Collaboration   IDT Collaboration with  Nursing;Occupational Therapist   Patient Position at End of Therapy Seated  (hand off to OT)   Collaboration Comments RN updated   Session Information   Date / Session Number  3/20: eval only, d/c PT

## 2024-03-21 LAB
BACTERIA BLD CULT: ABNORMAL
BACTERIA BLD CULT: ABNORMAL
BACTERIA UR CULT: NORMAL
SIGNIFICANT IND 70042: ABNORMAL
SIGNIFICANT IND 70042: NORMAL
SITE SITE: ABNORMAL
SITE SITE: NORMAL
SOURCE SOURCE: ABNORMAL
SOURCE SOURCE: NORMAL

## 2024-03-23 LAB
BACTERIA BLD CULT: NORMAL
SIGNIFICANT IND 70042: NORMAL
SITE SITE: NORMAL
SOURCE SOURCE: NORMAL

## 2024-03-26 ENCOUNTER — HOSPITAL ENCOUNTER (OUTPATIENT)
Dept: LAB | Facility: MEDICAL CENTER | Age: 69
End: 2024-03-26
Attending: INTERNAL MEDICINE
Payer: COMMERCIAL

## 2024-03-26 LAB
ALBUMIN SERPL BCP-MCNC: 3.7 G/DL (ref 3.2–4.9)
ALBUMIN/GLOB SERPL: 1.5 G/DL
ALP SERPL-CCNC: 101 U/L (ref 30–99)
ALT SERPL-CCNC: 15 U/L (ref 2–50)
ANION GAP SERPL CALC-SCNC: 13 MMOL/L (ref 7–16)
ANISOCYTOSIS BLD QL SMEAR: ABNORMAL
AST SERPL-CCNC: 12 U/L (ref 12–45)
BASOPHILS # BLD AUTO: 0.8 % (ref 0–1.8)
BASOPHILS # BLD: 0.04 K/UL (ref 0–0.12)
BILIRUB SERPL-MCNC: 0.3 MG/DL (ref 0.1–1.5)
BUN SERPL-MCNC: 10 MG/DL (ref 8–22)
CALCIUM ALBUM COR SERPL-MCNC: 9.4 MG/DL (ref 8.5–10.5)
CALCIUM SERPL-MCNC: 9.2 MG/DL (ref 8.5–10.5)
CHLORIDE SERPL-SCNC: 107 MMOL/L (ref 96–112)
CO2 SERPL-SCNC: 22 MMOL/L (ref 20–33)
CREAT SERPL-MCNC: 0.38 MG/DL (ref 0.5–1.4)
EOSINOPHIL # BLD AUTO: 0 K/UL (ref 0–0.51)
EOSINOPHIL NFR BLD: 0 % (ref 0–6.9)
ERYTHROCYTE [DISTWIDTH] IN BLOOD BY AUTOMATED COUNT: 52.4 FL (ref 35.9–50)
GFR SERPLBLD CREATININE-BSD FMLA CKD-EPI: 109 ML/MIN/1.73 M 2
GLOBULIN SER CALC-MCNC: 2.5 G/DL (ref 1.9–3.5)
GLUCOSE SERPL-MCNC: 98 MG/DL (ref 65–99)
HCT VFR BLD AUTO: 23.8 % (ref 37–47)
HGB BLD-MCNC: 8.2 G/DL (ref 12–16)
LYMPHOCYTES # BLD AUTO: 1.59 K/UL (ref 1–4.8)
LYMPHOCYTES NFR BLD: 31.2 % (ref 22–41)
MACROCYTES BLD QL SMEAR: ABNORMAL
MANUAL DIFF BLD: NORMAL
MCH RBC QN AUTO: 33.6 PG (ref 27–33)
MCHC RBC AUTO-ENTMCNC: 34.5 G/DL (ref 32.2–35.5)
MCV RBC AUTO: 97.5 FL (ref 81.4–97.8)
METAMYELOCYTES NFR BLD MANUAL: 1.6 %
MICROCYTES BLD QL SMEAR: ABNORMAL
MONOCYTES # BLD AUTO: 0.71 K/UL (ref 0–0.85)
MONOCYTES NFR BLD AUTO: 13.9 % (ref 0–13.4)
MORPHOLOGY BLD-IMP: NORMAL
NEUTROPHILS # BLD AUTO: 2.68 K/UL (ref 1.82–7.42)
NEUTROPHILS NFR BLD: 49.2 % (ref 44–72)
NEUTS BAND NFR BLD MANUAL: 3.3 % (ref 0–10)
NRBC # BLD AUTO: 0.09 K/UL
NRBC BLD-RTO: 1.8 /100 WBC (ref 0–0.2)
OVALOCYTES BLD QL SMEAR: NORMAL
PLATELET # BLD AUTO: 177 K/UL (ref 164–446)
PLATELET BLD QL SMEAR: NORMAL
PMV BLD AUTO: 11.2 FL (ref 9–12.9)
POIKILOCYTOSIS BLD QL SMEAR: NORMAL
POLYCHROMASIA BLD QL SMEAR: NORMAL
POTASSIUM SERPL-SCNC: 3.4 MMOL/L (ref 3.6–5.5)
PROT SERPL-MCNC: 6.2 G/DL (ref 6–8.2)
RBC # BLD AUTO: 2.44 M/UL (ref 4.2–5.4)
RBC BLD AUTO: PRESENT
SCHISTOCYTES BLD QL SMEAR: NORMAL
SODIUM SERPL-SCNC: 142 MMOL/L (ref 135–145)
WBC # BLD AUTO: 5.1 K/UL (ref 4.8–10.8)

## 2024-03-26 PROCEDURE — 80053 COMPREHEN METABOLIC PANEL: CPT

## 2024-03-26 PROCEDURE — 85007 BL SMEAR W/DIFF WBC COUNT: CPT

## 2024-03-26 PROCEDURE — 85027 COMPLETE CBC AUTOMATED: CPT

## 2024-03-26 PROCEDURE — 36415 COLL VENOUS BLD VENIPUNCTURE: CPT

## 2024-03-28 ENCOUNTER — HOSPITAL ENCOUNTER (OUTPATIENT)
Dept: RADIOLOGY | Facility: MEDICAL CENTER | Age: 69
End: 2024-03-28
Attending: INTERNAL MEDICINE
Payer: COMMERCIAL

## 2024-03-28 DIAGNOSIS — C50.812 MALIGNANT NEOPLASM OF OVERLAPPING SITES OF LEFT FEMALE BREAST, UNSPECIFIED ESTROGEN RECEPTOR STATUS (HCC): ICD-10-CM

## 2024-03-28 PROCEDURE — 93970 EXTREMITY STUDY: CPT

## 2024-04-01 ENCOUNTER — HOSPITAL ENCOUNTER (OUTPATIENT)
Dept: LAB | Facility: MEDICAL CENTER | Age: 69
End: 2024-04-01
Attending: INTERNAL MEDICINE
Payer: COMMERCIAL

## 2024-04-01 LAB
ALBUMIN SERPL BCP-MCNC: 4.3 G/DL (ref 3.2–4.9)
ALBUMIN/GLOB SERPL: 2 G/DL
ALP SERPL-CCNC: 86 U/L (ref 30–99)
ALT SERPL-CCNC: 9 U/L (ref 2–50)
ANION GAP SERPL CALC-SCNC: 15 MMOL/L (ref 7–16)
ANISOCYTOSIS BLD QL SMEAR: ABNORMAL
AST SERPL-CCNC: 19 U/L (ref 12–45)
BASOPHILS # BLD AUTO: 0.8 % (ref 0–1.8)
BASOPHILS # BLD: 0.07 K/UL (ref 0–0.12)
BILIRUB SERPL-MCNC: 0.3 MG/DL (ref 0.1–1.5)
BUN SERPL-MCNC: 9 MG/DL (ref 8–22)
CALCIUM ALBUM COR SERPL-MCNC: 9.2 MG/DL (ref 8.5–10.5)
CALCIUM SERPL-MCNC: 9.4 MG/DL (ref 8.5–10.5)
CHLORIDE SERPL-SCNC: 105 MMOL/L (ref 96–112)
CO2 SERPL-SCNC: 20 MMOL/L (ref 20–33)
COMMENT 1642: NORMAL
CREAT SERPL-MCNC: 0.38 MG/DL (ref 0.5–1.4)
DACRYOCYTES BLD QL SMEAR: NORMAL
EOSINOPHIL # BLD AUTO: 0.01 K/UL (ref 0–0.51)
EOSINOPHIL NFR BLD: 0.1 % (ref 0–6.9)
ERYTHROCYTE [DISTWIDTH] IN BLOOD BY AUTOMATED COUNT: 73.9 FL (ref 35.9–50)
FERRITIN SERPL-MCNC: 382 NG/ML (ref 10–291)
FOLATE SERPL-MCNC: 16 NG/ML
GFR SERPLBLD CREATININE-BSD FMLA CKD-EPI: 109 ML/MIN/1.73 M 2
GLOBULIN SER CALC-MCNC: 2.2 G/DL (ref 1.9–3.5)
GLUCOSE SERPL-MCNC: 104 MG/DL (ref 65–99)
HCT VFR BLD AUTO: 29.4 % (ref 37–47)
HGB BLD-MCNC: 9.7 G/DL (ref 12–16)
HGB RETIC QN AUTO: 34.6 PG/CELL (ref 29–35)
IMM GRANULOCYTES # BLD AUTO: 0.06 K/UL (ref 0–0.11)
IMM GRANULOCYTES NFR BLD AUTO: 0.7 % (ref 0–0.9)
IMM RETICS NFR: 23.1 % (ref 2.6–16.1)
IRON SATN MFR SERPL: 17 % (ref 15–55)
IRON SERPL-MCNC: 42 UG/DL (ref 40–170)
LYMPHOCYTES # BLD AUTO: 1.08 K/UL (ref 1–4.8)
LYMPHOCYTES NFR BLD: 11.9 % (ref 22–41)
MACROCYTES BLD QL SMEAR: ABNORMAL
MCH RBC QN AUTO: 34.2 PG (ref 27–33)
MCHC RBC AUTO-ENTMCNC: 33 G/DL (ref 32.2–35.5)
MCV RBC AUTO: 103.5 FL (ref 81.4–97.8)
MICROCYTES BLD QL SMEAR: ABNORMAL
MONOCYTES # BLD AUTO: 1.1 K/UL (ref 0–0.85)
MONOCYTES NFR BLD AUTO: 12.2 % (ref 0–13.4)
MORPHOLOGY BLD-IMP: NORMAL
NEUTROPHILS # BLD AUTO: 6.73 K/UL (ref 1.82–7.42)
NEUTROPHILS NFR BLD: 74.3 % (ref 44–72)
NRBC # BLD AUTO: 0 K/UL
NRBC BLD-RTO: 0 /100 WBC (ref 0–0.2)
OVALOCYTES BLD QL SMEAR: NORMAL
PLATELET # BLD AUTO: 440 K/UL (ref 164–446)
PLATELET BLD QL SMEAR: NORMAL
PMV BLD AUTO: 9.8 FL (ref 9–12.9)
POIKILOCYTOSIS BLD QL SMEAR: NORMAL
POTASSIUM SERPL-SCNC: 3.7 MMOL/L (ref 3.6–5.5)
PROT SERPL-MCNC: 6.5 G/DL (ref 6–8.2)
RBC # BLD AUTO: 2.84 M/UL (ref 4.2–5.4)
RBC BLD AUTO: PRESENT
RETICS # AUTO: 0.24 M/UL (ref 0.04–0.12)
RETICS/RBC NFR: 8.4 % (ref 0.8–2.6)
SCHISTOCYTES BLD QL SMEAR: NORMAL
SODIUM SERPL-SCNC: 140 MMOL/L (ref 135–145)
TIBC SERPL-MCNC: 250 UG/DL (ref 250–450)
TOXIC GRANULES BLD QL SMEAR: NORMAL
UIBC SERPL-MCNC: 208 UG/DL (ref 110–370)
VIT B12 SERPL-MCNC: >4000 PG/ML (ref 211–911)
WBC # BLD AUTO: 9.1 K/UL (ref 4.8–10.8)

## 2024-04-01 PROCEDURE — 82728 ASSAY OF FERRITIN: CPT

## 2024-04-01 PROCEDURE — 83550 IRON BINDING TEST: CPT

## 2024-04-01 PROCEDURE — 80053 COMPREHEN METABOLIC PANEL: CPT

## 2024-04-01 PROCEDURE — 36415 COLL VENOUS BLD VENIPUNCTURE: CPT

## 2024-04-01 PROCEDURE — 85046 RETICYTE/HGB CONCENTRATE: CPT

## 2024-04-01 PROCEDURE — 83540 ASSAY OF IRON: CPT

## 2024-04-01 PROCEDURE — 85025 COMPLETE CBC W/AUTO DIFF WBC: CPT

## 2024-04-01 PROCEDURE — 82607 VITAMIN B-12: CPT

## 2024-04-01 PROCEDURE — 82746 ASSAY OF FOLIC ACID SERUM: CPT

## 2024-04-02 ENCOUNTER — APPOINTMENT (OUTPATIENT)
Dept: RADIOLOGY | Facility: MEDICAL CENTER | Age: 69
End: 2024-04-02
Attending: INTERNAL MEDICINE
Payer: COMMERCIAL

## 2024-04-02 DIAGNOSIS — C50.812 MALIGNANT NEOPLASM OF OVERLAPPING SITES OF LEFT FEMALE BREAST, UNSPECIFIED ESTROGEN RECEPTOR STATUS (HCC): ICD-10-CM

## 2024-04-02 PROCEDURE — 76642 ULTRASOUND BREAST LIMITED: CPT | Mod: LT

## 2024-04-12 ENCOUNTER — HOSPITAL ENCOUNTER (OUTPATIENT)
Facility: MEDICAL CENTER | Age: 69
End: 2024-04-12
Attending: INTERNAL MEDICINE
Payer: COMMERCIAL

## 2024-04-12 LAB
ALBUMIN SERPL BCP-MCNC: 4 G/DL (ref 3.2–4.9)
ALBUMIN/GLOB SERPL: 1.7 G/DL
ALP SERPL-CCNC: 112 U/L (ref 30–99)
ALT SERPL-CCNC: 18 U/L (ref 2–50)
ANION GAP SERPL CALC-SCNC: 10 MMOL/L (ref 7–16)
AST SERPL-CCNC: 25 U/L (ref 12–45)
BILIRUB SERPL-MCNC: 0.2 MG/DL (ref 0.1–1.5)
BUN SERPL-MCNC: 9 MG/DL (ref 8–22)
CALCIUM ALBUM COR SERPL-MCNC: 9.5 MG/DL (ref 8.5–10.5)
CALCIUM SERPL-MCNC: 9.5 MG/DL (ref 8.5–10.5)
CHLORIDE SERPL-SCNC: 106 MMOL/L (ref 96–112)
CO2 SERPL-SCNC: 25 MMOL/L (ref 20–33)
CREAT SERPL-MCNC: 0.49 MG/DL (ref 0.5–1.4)
GFR SERPLBLD CREATININE-BSD FMLA CKD-EPI: 102 ML/MIN/1.73 M 2
GLOBULIN SER CALC-MCNC: 2.3 G/DL (ref 1.9–3.5)
GLUCOSE SERPL-MCNC: 90 MG/DL (ref 65–99)
POTASSIUM SERPL-SCNC: 4.2 MMOL/L (ref 3.6–5.5)
PROT SERPL-MCNC: 6.3 G/DL (ref 6–8.2)
SODIUM SERPL-SCNC: 141 MMOL/L (ref 135–145)

## 2024-04-12 PROCEDURE — 80053 COMPREHEN METABOLIC PANEL: CPT

## 2024-04-29 ENCOUNTER — HOSPITAL ENCOUNTER (OUTPATIENT)
Dept: LAB | Facility: MEDICAL CENTER | Age: 69
End: 2024-04-29
Attending: INTERNAL MEDICINE
Payer: COMMERCIAL

## 2024-04-29 LAB
ANISOCYTOSIS BLD QL SMEAR: ABNORMAL
BASOPHILS # BLD AUTO: 0.5 % (ref 0–1.8)
BASOPHILS # BLD: 0.09 K/UL (ref 0–0.12)
COMMENT 1642: NORMAL
DACRYOCYTES BLD QL SMEAR: NORMAL
EOSINOPHIL # BLD AUTO: 0.15 K/UL (ref 0–0.51)
EOSINOPHIL NFR BLD: 0.9 % (ref 0–6.9)
ERYTHROCYTE [DISTWIDTH] IN BLOOD BY AUTOMATED COUNT: 69.1 FL (ref 35.9–50)
HCT VFR BLD AUTO: 33.8 % (ref 37–47)
HGB BLD-MCNC: 10.7 G/DL (ref 12–16)
IMM GRANULOCYTES # BLD AUTO: 0.65 K/UL (ref 0–0.11)
IMM GRANULOCYTES NFR BLD AUTO: 3.8 % (ref 0–0.9)
LYMPHOCYTES # BLD AUTO: 1.86 K/UL (ref 1–4.8)
LYMPHOCYTES NFR BLD: 10.9 % (ref 22–41)
MACROCYTES BLD QL SMEAR: ABNORMAL
MCH RBC QN AUTO: 33.8 PG (ref 27–33)
MCHC RBC AUTO-ENTMCNC: 31.7 G/DL (ref 32.2–35.5)
MCV RBC AUTO: 106.6 FL (ref 81.4–97.8)
MONOCYTES # BLD AUTO: 1.11 K/UL (ref 0–0.85)
MONOCYTES NFR BLD AUTO: 6.5 % (ref 0–13.4)
MORPHOLOGY BLD-IMP: NORMAL
NEUTROPHILS # BLD AUTO: 13.14 K/UL (ref 1.82–7.42)
NEUTROPHILS NFR BLD: 77.4 % (ref 44–72)
NRBC # BLD AUTO: 0.03 K/UL
NRBC BLD-RTO: 0.2 /100 WBC (ref 0–0.2)
OVALOCYTES BLD QL SMEAR: NORMAL
PLATELET # BLD AUTO: 342 K/UL (ref 164–446)
PLATELET BLD QL SMEAR: NORMAL
PMV BLD AUTO: 10.5 FL (ref 9–12.9)
POIKILOCYTOSIS BLD QL SMEAR: NORMAL
POLYCHROMASIA BLD QL SMEAR: NORMAL
RBC # BLD AUTO: 3.17 M/UL (ref 4.2–5.4)
RBC BLD AUTO: PRESENT
WBC # BLD AUTO: 17 K/UL (ref 4.8–10.8)

## 2024-04-29 PROCEDURE — 36415 COLL VENOUS BLD VENIPUNCTURE: CPT

## 2024-04-29 PROCEDURE — 85025 COMPLETE CBC W/AUTO DIFF WBC: CPT

## 2024-05-13 ENCOUNTER — HOSPITAL ENCOUNTER (OUTPATIENT)
Dept: LAB | Facility: MEDICAL CENTER | Age: 69
End: 2024-05-13
Attending: INTERNAL MEDICINE
Payer: COMMERCIAL

## 2024-05-13 LAB
ALBUMIN SERPL BCP-MCNC: 4.2 G/DL (ref 3.2–4.9)
ALBUMIN/GLOB SERPL: 1.6 G/DL
ALP SERPL-CCNC: 124 U/L (ref 30–99)
ALT SERPL-CCNC: 21 U/L (ref 2–50)
ANION GAP SERPL CALC-SCNC: 15 MMOL/L (ref 7–16)
AST SERPL-CCNC: 26 U/L (ref 12–45)
BASOPHILS # BLD AUTO: 0.5 % (ref 0–1.8)
BASOPHILS # BLD: 0.08 K/UL (ref 0–0.12)
BILIRUB SERPL-MCNC: 0.3 MG/DL (ref 0.1–1.5)
BUN SERPL-MCNC: 10 MG/DL (ref 8–22)
CALCIUM ALBUM COR SERPL-MCNC: 9.5 MG/DL (ref 8.5–10.5)
CALCIUM SERPL-MCNC: 9.7 MG/DL (ref 8.5–10.5)
CHLORIDE SERPL-SCNC: 101 MMOL/L (ref 96–112)
CO2 SERPL-SCNC: 21 MMOL/L (ref 20–33)
CREAT SERPL-MCNC: 0.69 MG/DL (ref 0.5–1.4)
EOSINOPHIL # BLD AUTO: 0.09 K/UL (ref 0–0.51)
EOSINOPHIL NFR BLD: 0.6 % (ref 0–6.9)
ERYTHROCYTE [DISTWIDTH] IN BLOOD BY AUTOMATED COUNT: 58.3 FL (ref 35.9–50)
GFR SERPLBLD CREATININE-BSD FMLA CKD-EPI: 94 ML/MIN/1.73 M 2
GLOBULIN SER CALC-MCNC: 2.7 G/DL (ref 1.9–3.5)
GLUCOSE SERPL-MCNC: 121 MG/DL (ref 65–99)
HCT VFR BLD AUTO: 32.3 % (ref 37–47)
HGB BLD-MCNC: 10.7 G/DL (ref 12–16)
IMM GRANULOCYTES # BLD AUTO: 0.53 K/UL (ref 0–0.11)
IMM GRANULOCYTES NFR BLD AUTO: 3.5 % (ref 0–0.9)
LYMPHOCYTES # BLD AUTO: 1.5 K/UL (ref 1–4.8)
LYMPHOCYTES NFR BLD: 9.8 % (ref 22–41)
MCH RBC QN AUTO: 34 PG (ref 27–33)
MCHC RBC AUTO-ENTMCNC: 33.1 G/DL (ref 32.2–35.5)
MCV RBC AUTO: 102.5 FL (ref 81.4–97.8)
MONOCYTES # BLD AUTO: 0.93 K/UL (ref 0–0.85)
MONOCYTES NFR BLD AUTO: 6.1 % (ref 0–13.4)
NEUTROPHILS # BLD AUTO: 12.22 K/UL (ref 1.82–7.42)
NEUTROPHILS NFR BLD: 79.5 % (ref 44–72)
NRBC # BLD AUTO: 0.03 K/UL
NRBC BLD-RTO: 0.2 /100 WBC (ref 0–0.2)
PLATELET # BLD AUTO: 348 K/UL (ref 164–446)
PMV BLD AUTO: 10.5 FL (ref 9–12.9)
POTASSIUM SERPL-SCNC: 4 MMOL/L (ref 3.6–5.5)
PROT SERPL-MCNC: 6.9 G/DL (ref 6–8.2)
RBC # BLD AUTO: 3.15 M/UL (ref 4.2–5.4)
SODIUM SERPL-SCNC: 137 MMOL/L (ref 135–145)
WBC # BLD AUTO: 15.4 K/UL (ref 4.8–10.8)

## 2024-06-05 ENCOUNTER — HOSPITAL ENCOUNTER (OUTPATIENT)
Dept: RADIOLOGY | Facility: MEDICAL CENTER | Age: 69
End: 2024-06-05
Attending: INTERNAL MEDICINE
Payer: COMMERCIAL

## 2024-06-05 DIAGNOSIS — T81.718A IATROGENIC PULMONARY EMBOLISM AND INFARCTION, INITIAL ENCOUNTER (HCC): ICD-10-CM

## 2024-06-05 DIAGNOSIS — Z79.811 LONG TERM (CURRENT) USE OF AROMATASE INHIBITORS: ICD-10-CM

## 2024-06-05 DIAGNOSIS — D64.81 ANEMIA DUE TO CHEMOTHERAPY: ICD-10-CM

## 2024-06-05 DIAGNOSIS — R29.2 ABNORMAL REFLEX: ICD-10-CM

## 2024-06-05 DIAGNOSIS — C50.812 MALIGNANT NEOPLASM OF OVERLAPPING SITES OF LEFT FEMALE BREAST, UNSPECIFIED ESTROGEN RECEPTOR STATUS (HCC): ICD-10-CM

## 2024-06-05 DIAGNOSIS — T45.1X5A AGRANULOCYTOSIS SECONDARY TO CANCER CHEMOTHERAPY (HCC): ICD-10-CM

## 2024-06-05 DIAGNOSIS — Z51.11 ENCOUNTER FOR ANTINEOPLASTIC CHEMOTHERAPY: ICD-10-CM

## 2024-06-05 DIAGNOSIS — T45.1X5A ANEMIA DUE TO CHEMOTHERAPY: ICD-10-CM

## 2024-06-05 DIAGNOSIS — D70.1 AGRANULOCYTOSIS SECONDARY TO CANCER CHEMOTHERAPY (HCC): ICD-10-CM

## 2024-06-05 DIAGNOSIS — I26.99 IATROGENIC PULMONARY EMBOLISM AND INFARCTION, INITIAL ENCOUNTER (HCC): ICD-10-CM

## 2024-06-05 PROCEDURE — 76536 US EXAM OF HEAD AND NECK: CPT

## 2024-06-07 ENCOUNTER — HOSPITAL ENCOUNTER (OUTPATIENT)
Dept: RADIOLOGY | Facility: MEDICAL CENTER | Age: 69
End: 2024-06-07
Attending: INTERNAL MEDICINE
Payer: COMMERCIAL

## 2024-06-07 DIAGNOSIS — T45.1X5A ANEMIA DUE TO CHEMOTHERAPY: ICD-10-CM

## 2024-06-07 DIAGNOSIS — D64.81 ANEMIA DUE TO CHEMOTHERAPY: ICD-10-CM

## 2024-06-07 DIAGNOSIS — I26.99 IATROGENIC PULMONARY EMBOLISM AND INFARCTION, INITIAL ENCOUNTER (HCC): ICD-10-CM

## 2024-06-07 DIAGNOSIS — R29.2 ABNORMAL REFLEX: ICD-10-CM

## 2024-06-07 DIAGNOSIS — Z51.11 ENCOUNTER FOR ANTINEOPLASTIC CHEMOTHERAPY: ICD-10-CM

## 2024-06-07 DIAGNOSIS — D70.1 AGRANULOCYTOSIS SECONDARY TO CANCER CHEMOTHERAPY (HCC): ICD-10-CM

## 2024-06-07 DIAGNOSIS — C50.812 MALIGNANT NEOPLASM OF OVERLAPPING SITES OF LEFT FEMALE BREAST, UNSPECIFIED ESTROGEN RECEPTOR STATUS (HCC): ICD-10-CM

## 2024-06-07 DIAGNOSIS — Z79.811 LONG TERM (CURRENT) USE OF AROMATASE INHIBITORS: ICD-10-CM

## 2024-06-07 DIAGNOSIS — T81.718A IATROGENIC PULMONARY EMBOLISM AND INFARCTION, INITIAL ENCOUNTER (HCC): ICD-10-CM

## 2024-06-07 DIAGNOSIS — T45.1X5A AGRANULOCYTOSIS SECONDARY TO CANCER CHEMOTHERAPY (HCC): ICD-10-CM

## 2024-06-07 PROCEDURE — 700117 HCHG RX CONTRAST REV CODE 255: Performed by: INTERNAL MEDICINE

## 2024-06-07 PROCEDURE — A9579 GAD-BASE MR CONTRAST NOS,1ML: HCPCS | Performed by: INTERNAL MEDICINE

## 2024-06-07 PROCEDURE — 77049 MRI BREAST C-+ W/CAD BI: CPT

## 2024-06-07 RX ADMIN — GADOTERIDOL 20 ML: 279.3 INJECTION, SOLUTION INTRAVENOUS at 11:56

## 2024-07-08 ENCOUNTER — HOSPITAL ENCOUNTER (OUTPATIENT)
Facility: MEDICAL CENTER | Age: 69
End: 2024-07-08
Attending: INTERNAL MEDICINE
Payer: COMMERCIAL

## 2024-07-08 LAB — 25(OH)D3 SERPL-MCNC: 28 NG/ML (ref 30–100)

## 2024-07-08 PROCEDURE — 86300 IMMUNOASSAY TUMOR CA 15-3: CPT

## 2024-07-08 PROCEDURE — 82306 VITAMIN D 25 HYDROXY: CPT

## 2024-07-10 LAB — CANCER AG27-29 SERPL-ACNC: 30.7 U/ML

## 2024-10-18 ENCOUNTER — HOSPITAL ENCOUNTER (OUTPATIENT)
Facility: MEDICAL CENTER | Age: 69
End: 2024-10-18
Payer: COMMERCIAL

## 2024-10-18 LAB
FORWARD REASON: SPWHY: NORMAL
FORWARDED TO LAB: SPWHR: NORMAL
SPECIMEN SENT: SPWT1: NORMAL

## 2024-11-15 ENCOUNTER — HOSPITAL ENCOUNTER (OUTPATIENT)
Facility: MEDICAL CENTER | Age: 69
End: 2024-11-15
Payer: COMMERCIAL

## 2024-11-15 LAB
FORWARD REASON: SPWHY: NORMAL
FORWARDED TO LAB: SPWHR: NORMAL
SPECIMEN SENT: SPWT1: NORMAL

## (undated) DEVICE — ELECTRODE 850 FOAM ADHESIVE - HYDROGEL RADIOTRNSPRNT (50/PK)

## (undated) DEVICE — KIT  I.V. START (100EA/CA)

## (undated) DEVICE — SUTURE GENERAL

## (undated) DEVICE — CANNULA STRAIGHT 23G X 1 1/4IN - W/BLUNT (10/BX)

## (undated) DEVICE — TUBING CLEARLINK DUO-VENT - C-FLO (48EA/CA)

## (undated) DEVICE — NEEDLE FILTER ASPIRATION 18 GA X 1 1/2 IN (100EA/BX)

## (undated) DEVICE — CANNULA DIVIDED ADULT CO2 - SAMPLE W/FEMALE CONNCT (25/CA)

## (undated) DEVICE — WATER IRRIGATION STERILE 1000ML (12EA/CA)

## (undated) DEVICE — PROTECTOR ULNA NERVE - (36PR/CA)

## (undated) DEVICE — LENS GRIESHABER MACULAR

## (undated) DEVICE — SENSOR SPO2 NEO LNCS ADHESIVE (20/BX) SEE USER NOTES

## (undated) DEVICE — PACK VITRECTOMY 23G 10K BEVELED (1EA/BX)

## (undated) DEVICE — KIT ANESTHESIA W/CIRCUIT & 3/LT BAG W/FILTER (20EA/CA)

## (undated) DEVICE — SHIELD OPTH AL GRTR CVR FOX (50EA/BX)

## (undated) DEVICE — SET LEADWIRE 5 LEAD BEDSIDE DISPOSABLE ECG (1SET OF 5/EA)

## (undated) DEVICE — PACK VITRECTOMY (1EA/CA)

## (undated) DEVICE — PACK DUO VISC LARGE

## (undated) DEVICE — CANISTER SUCTION RIGID RED 1500CC (40EA/CA)

## (undated) DEVICE — MASK ANESTHESIA ADULT  - (100/CA)

## (undated) DEVICE — BACKFLUSH SOFT TIP 23GA - (6/BX)

## (undated) DEVICE — GLOVE BIOGEL SZ 7.5 SURGICAL PF LTX - (50PR/BX 4BX/CA)

## (undated) DEVICE — SUCTION INSTRUMENT YANKAUER BULBOUS TIP W/O VENT (50EA/CA)

## (undated) DEVICE — TOWEL STOP TIMEOUT SAFETY FLAG (40EA/CA)

## (undated) DEVICE — CANISTER SUCTION 3000ML MECHANICAL FILTER AUTO SHUTOFF MEDI-VAC NONSTERILE LF DISP  (40EA/CA)

## (undated) DEVICE — SODIUM CHL IRRIGATION 0.9% 1000ML (12EA/CA)

## (undated) DEVICE — LACTATED RINGERS INJ 1000 ML - (14EA/CA 60CA/PF)

## (undated) DEVICE — PAD EYE GAUZE COVERED OVAL 1 5/8 X 2 5/8" STERILE"

## (undated) DEVICE — CANNULA SUB-TENONS ANESTH. 1.1X25MM 19GAX1IN (10EA/SP)

## (undated) DEVICE — SYRINGE NON SAFETY 10 CC 20 GA X 1-1/2 IN (100/BX 4BX/CA)

## (undated) DEVICE — CANNULA SOFT TIP STERILE SINGLE USE VITRECTOMY 23GA 0.8MM (10EA/BX)

## (undated) DEVICE — PROBE 23 GA ILLUM FLEX CURVED - LASER(6/BX)

## (undated) DEVICE — SLEEVE, VASO, THIGH, MED

## (undated) DEVICE — CANNULA INJECTION 27G (EYE) - 10/BX ALCON

## (undated) DEVICE — SUTURE EYE

## (undated) DEVICE — CATHETER IV 20 GA X 1-1/4 ---SURG.& SDS ONLY--- (50EA/BX)

## (undated) DEVICE — FORCEP ILM 23GA DISP REVOLUT. - (6/BX)